# Patient Record
Sex: FEMALE | Race: WHITE | NOT HISPANIC OR LATINO | Employment: FULL TIME | URBAN - METROPOLITAN AREA
[De-identification: names, ages, dates, MRNs, and addresses within clinical notes are randomized per-mention and may not be internally consistent; named-entity substitution may affect disease eponyms.]

---

## 2017-02-09 ENCOUNTER — ALLSCRIPTS OFFICE VISIT (OUTPATIENT)
Dept: OTHER | Facility: OTHER | Age: 27
End: 2017-02-09

## 2017-06-08 ENCOUNTER — GENERIC CONVERSION - ENCOUNTER (OUTPATIENT)
Dept: OTHER | Facility: OTHER | Age: 27
End: 2017-06-08

## 2017-06-14 ENCOUNTER — GENERIC CONVERSION - ENCOUNTER (OUTPATIENT)
Dept: OTHER | Facility: OTHER | Age: 27
End: 2017-06-14

## 2017-06-14 LAB
A/G RATIO (HISTORICAL): 1.7 (ref 1.2–2.2)
ALBUMIN SERPL BCP-MCNC: 4.2 G/DL (ref 3.5–5.5)
ALP SERPL-CCNC: 55 IU/L (ref 39–117)
ALT SERPL W P-5'-P-CCNC: 13 IU/L (ref 0–32)
AST SERPL W P-5'-P-CCNC: 16 IU/L (ref 0–40)
BILIRUB SERPL-MCNC: 0.8 MG/DL (ref 0–1.2)
BUN SERPL-MCNC: 14 MG/DL (ref 6–20)
BUN/CREA RATIO (HISTORICAL): 16 (ref 9–23)
CALCIUM SERPL-MCNC: 9.4 MG/DL (ref 8.7–10.2)
CHLORIDE SERPL-SCNC: 100 MMOL/L (ref 96–106)
CHOLEST SERPL-MCNC: 172 MG/DL (ref 100–199)
CO2 SERPL-SCNC: 24 MMOL/L (ref 18–29)
CREAT SERPL-MCNC: 0.9 MG/DL (ref 0.57–1)
EGFR AFRICAN AMERICAN (HISTORICAL): 103 ML/MIN/1.73
EGFR-AMERICAN CALC (HISTORICAL): 89 ML/MIN/1.73
GLUCOSE SERPL-MCNC: 92 MG/DL (ref 65–99)
HDLC SERPL-MCNC: 62 MG/DL
LDLC SERPL CALC-MCNC: 91 MG/DL (ref 0–99)
POTASSIUM SERPL-SCNC: 4.5 MMOL/L (ref 3.5–5.2)
SODIUM SERPL-SCNC: 140 MMOL/L (ref 134–144)
TOT. GLOBULIN, SERUM (HISTORICAL): 2.5 G/DL (ref 1.5–4.5)
TOTAL PROTEIN (HISTORICAL): 6.7 G/DL (ref 6–8.5)
TRIGL SERPL-MCNC: 93 MG/DL (ref 0–149)

## 2017-06-15 LAB — VARICELLA ZOSTER IGG (HISTORICAL): <135 INDEX

## 2017-06-16 LAB
INTERPRETATION (HISTORICAL): NORMAL
QAUNTIFERON NIL VALUE (HISTORICAL): 0.08 IU/ML
QFT TB AG MINUS NIL VALUE (HISTORICAL): 0.02 IU/ML
QUANTIFERON CRITERIA (HISTORICAL): NORMAL
QUANTIFERON INCUB COMMENT (HISTORICAL): NORMAL
QUANTIFERON MITOGEN VALUE (HISTORICAL): 8.31 IU/ML
QUANTIFERON TB AG VALUE (HISTORICAL): 0.1 IU/ML
QUANTIFERON TB GOLD (HISTORICAL): NEGATIVE

## 2017-06-19 ENCOUNTER — GENERIC CONVERSION - ENCOUNTER (OUTPATIENT)
Dept: OTHER | Facility: OTHER | Age: 27
End: 2017-06-19

## 2018-01-13 NOTE — RESULT NOTES
Verified Results  (1) VARICELLA ZOSTER IMMUNITY(IGG) [id number] 11:37AM Nikhil Sharps     Test Name Result Flag Reference   Varicella Zoster IgG <135 index L Immune >165   Negative          <135                                                Equivocal    135 - 165                                                Positive          >165                 A positive result generally indicates exposure to the                 pathogen or administration of specific immunoglobulins,                 but it is not indication of active infection or stage                 of disease  ([facility]) QuantiFERON TB Gold (In Tube) [id number] 11:37AM Nikhil Sharps     Test Name Result Flag Reference   QuantiFERON Incubation Comment     Incubated, specimen forwarded to [address] [place] [address] for  completion of the assay  QuantiFERON TB Gold Negative  Negative   QuantiFERON Criteria Comment     To be considered positive a specimen should have a TB Ag minus Nil  value greater than or equal to 0 35 IU/mL and in addition the TB Ag  minus Nil value must be greater than or equal to 25% of the Nil  value  There may be insufficient information in these values to  differentiate between some negative and some indeterminate test  values  QuantiFERON TB Ag Value 0 10 IU/mL     QuantiFERON Nil Value 0 08 IU/mL     QuantiFERON Mitogen Value 8 31 IU/mL     QFT TB Ag minus Nil Value 0 02 IU/mL     Interpretation: Comment     The QuantiFERON TB Gold (in Tube) assay is intended for use as an aid  in the diagnosis of TB infection  Negative results suggest that there  is no TB infection  In patients with high suspicion of exposure, a  negative test should be repeated  A positive test indicates infection  with Mycobacterium tuberculosis  Among individuals without  tuberculosis infection, a positive test may be due to exposure to  [place], M  ciarragai or M  marinum  On the Internet, go to  cdc gov/tb for further details       (1) COMPREHENSIVE METABOLIC PANEL [id number] 11:33AM [employer] ([employer])     Test Name Result Flag Reference   Glucose, Serum 92 mg/dL  65-99   BUN 14 mg/dL  6-20   Creatinine, Serum 0 90 mg/dL  0 57-1 00   BUN/Creatinine Ratio 16  9-23   Sodium, Serum 140 mmol/L  134-144   Potassium, Serum 4 5 mmol/L  3 5-5 2   Chloride, Serum 100 mmol/L     Carbon Dioxide, Total 24 mmol/L  18-29   Calcium, Serum 9 4 mg/dL  8 7-10 2   Protein, Total, Serum 6 7 g/dL  6 0-8 5   Albumin, Serum 4 2 g/dL  3 5-5 5   Globulin, Total 2 5 g/dL  1 5-4 5   A/G Ratio 1 7  1 2-2 2   Bilirubin, Total 0 8 mg/dL  0 0-1 2   Alkaline Phosphatase, S 55 IU/L     AST (SGOT) 16 IU/L  0-40   ALT (SGPT) 13 IU/L  0-32   eGFR If NonAfricn Am 89 mL/min/1 73  >59   eGFR If Africn Am 103 mL/min/1 73  >59     (1) LIPID PANEL FASTING W DIRECT LDL REFLEX [id number] 11:33AM [employer] ([employer])     Test Name Result Flag Reference   Cholesterol, Total 172 mg/dL  100-199   Triglycerides 93 mg/dL  0-149   HDL Cholesterol 62 mg/dL  >39   LDL Cholesterol Calc 91 mg/dL  0-99

## 2018-01-14 VITALS
RESPIRATION RATE: 16 BRPM | DIASTOLIC BLOOD PRESSURE: 70 MMHG | HEIGHT: 66 IN | HEART RATE: 110 BPM | WEIGHT: 138 LBS | OXYGEN SATURATION: 98 % | BODY MASS INDEX: 22.18 KG/M2 | SYSTOLIC BLOOD PRESSURE: 130 MMHG

## 2018-02-02 ENCOUNTER — OFFICE VISIT (OUTPATIENT)
Dept: FAMILY MEDICINE CLINIC | Facility: CLINIC | Age: 28
End: 2018-02-02
Payer: COMMERCIAL

## 2018-02-02 ENCOUNTER — TELEPHONE (OUTPATIENT)
Dept: FAMILY MEDICINE CLINIC | Facility: CLINIC | Age: 28
End: 2018-02-02

## 2018-02-02 VITALS
BODY MASS INDEX: 22.82 KG/M2 | SYSTOLIC BLOOD PRESSURE: 122 MMHG | HEIGHT: 66 IN | OXYGEN SATURATION: 100 % | DIASTOLIC BLOOD PRESSURE: 84 MMHG | WEIGHT: 142 LBS | HEART RATE: 83 BPM

## 2018-02-02 DIAGNOSIS — K21.9 GASTROESOPHAGEAL REFLUX DISEASE WITHOUT ESOPHAGITIS: Primary | ICD-10-CM

## 2018-02-02 PROCEDURE — 99213 OFFICE O/P EST LOW 20 MIN: CPT | Performed by: NURSE PRACTITIONER

## 2018-02-02 RX ORDER — OMEPRAZOLE 20 MG/1
20 CAPSULE, DELAYED RELEASE ORAL DAILY
Qty: 90 CAPSULE | Refills: 0 | Status: SHIPPED | OUTPATIENT
Start: 2018-02-02 | End: 2018-05-08 | Stop reason: ALTCHOICE

## 2018-02-02 NOTE — PATIENT INSTRUCTIONS
Start Omeprazole Capsule as directed  F/up with GI as discussed-referral given  Call or return with any problems or concerns

## 2018-02-02 NOTE — PROGRESS NOTES
Assessment/Plan:    No problem-specific Assessment & Plan notes found for this encounter  Diagnoses and all orders for this visit:    Gastroesophageal reflux disease without esophagitis  -     omeprazole (PriLOSEC) 20 mg delayed release capsule; Take 1 capsule (20 mg total) by mouth daily  -     Ambulatory referral to Gastroenterology; Future    Other orders  -     Levonorgestrel (LILETTA, 52 MG,) 18 6 MCG/DAY IUD; by Intrauterine route          Subjective:      Patient ID: Hamlet Sorenson is a 33 y o  female  Has had GERD symptoms for 2 years which have become worse in the past 2 months  Takes OTC omeprazole daily with not much relief  Heartburn   She complains of abdominal pain  She reports no chest pain, no choking, no coughing, no nausea, no sore throat or no wheezing  Pertinent negatives include no fatigue  The following portions of the patient's history were reviewed and updated as appropriate: allergies, current medications, past family history, past medical history, past social history, past surgical history and problem list     Patient Instructions   Start Omeprazole Capsule as directed  F/up with GI as discussed-referral given  Call or return with any problems or concerns       Review of Systems   Constitutional: Negative for activity change, diaphoresis, fatigue and fever  HENT: Negative for congestion, facial swelling, hearing loss, rhinorrhea, sinus pain, sinus pressure, sneezing, sore throat and voice change  Eyes: Negative for discharge and visual disturbance  Respiratory: Negative for cough, choking, chest tightness, shortness of breath, wheezing and stridor  Cardiovascular: Negative for chest pain, palpitations and leg swelling  Gastrointestinal: Positive for abdominal pain  Negative for abdominal distention, constipation, diarrhea, nausea and vomiting  Endocrine: Negative for polydipsia, polyphagia and polyuria     Genitourinary: Negative for difficulty urinating, dysuria, frequency and urgency  Musculoskeletal: Negative for arthralgias, back pain, gait problem, joint swelling, myalgias, neck pain and neck stiffness  Skin: Negative for color change, rash and wound  Neurological: Negative for dizziness, syncope, speech difficulty, weakness, light-headedness and headaches  Hematological: Negative for adenopathy  Does not bruise/bleed easily  Psychiatric/Behavioral: Negative for agitation, behavioral problems, confusion, hallucinations, sleep disturbance and suicidal ideas  The patient is not nervous/anxious  Objective:     Physical Exam   Constitutional: She is oriented to person, place, and time  She appears well-developed and well-nourished  Cardiovascular: Normal rate, regular rhythm and normal heart sounds  Pulmonary/Chest: Effort normal and breath sounds normal    Abdominal: Soft  Bowel sounds are normal  There is tenderness (tenderness to epigastric with palpation)  Musculoskeletal: Normal range of motion  Neurological: She is alert and oriented to person, place, and time  Skin: Skin is warm and dry  Psychiatric: She has a normal mood and affect   Her behavior is normal  Judgment and thought content normal

## 2018-02-02 NOTE — PROGRESS NOTES
I have reviewed the notes, assessments, and/or procedures performed by BL, I concur with her/his documentation of Lowell Net

## 2018-02-02 NOTE — TELEPHONE ENCOUNTER
WOULD LIKE TO BE REFERRED [facility] [place] [facility] [address] [facility]

## 2018-02-05 ENCOUNTER — TELEPHONE (OUTPATIENT)
Dept: FAMILY MEDICINE CLINIC | Facility: CLINIC | Age: 28
End: 2018-02-05

## 2018-05-08 ENCOUNTER — OFFICE VISIT (OUTPATIENT)
Dept: FAMILY MEDICINE CLINIC | Facility: HOSPITAL | Age: 28
End: 2018-05-08
Payer: COMMERCIAL

## 2018-05-08 VITALS
WEIGHT: 142.4 LBS | HEART RATE: 80 BPM | TEMPERATURE: 98 F | BODY MASS INDEX: 22.88 KG/M2 | SYSTOLIC BLOOD PRESSURE: 120 MMHG | DIASTOLIC BLOOD PRESSURE: 72 MMHG | HEIGHT: 66 IN

## 2018-05-08 DIAGNOSIS — Z76.89 ENCOUNTER TO ESTABLISH CARE: Primary | ICD-10-CM

## 2018-05-08 DIAGNOSIS — R21 RASH OF BACK: ICD-10-CM

## 2018-05-08 PROBLEM — K21.00 GASTROESOPHAGEAL REFLUX DISEASE WITH ESOPHAGITIS: Status: ACTIVE | Noted: 2018-05-08

## 2018-05-08 PROCEDURE — 3008F BODY MASS INDEX DOCD: CPT | Performed by: NURSE PRACTITIONER

## 2018-05-08 PROCEDURE — 99202 OFFICE O/P NEW SF 15 MIN: CPT | Performed by: NURSE PRACTITIONER

## 2018-05-08 RX ORDER — RANITIDINE 150 MG/1
150 TABLET ORAL 2 TIMES DAILY
COMMUNITY
End: 2020-08-12

## 2018-05-08 NOTE — PROGRESS NOTES
Assessment/Plan:     Rash on back possible contact dermatitis  Will treat with stronger topical steroid  Advised starting with small area and if improvement can proceed to treat the whole rash  If no improvement recommend she see dermatology  Diagnoses and all orders for this visit:    Encounter to establish care    Rash of back  -     betamethasone valerate (VALISONE) 0 1 % cream; Apply topically 2 (two) times a day    Other orders  -     ranitidine (ZANTAC) 150 mg tablet; Take 150 mg by mouth 2 (two) times a day          Subjective:     Patient ID: Nesha King is a 33 y o  female  Had GERD for a long time  Had EGD a few months ago and saw inflammation  Prilosec not working  Now on Zantac twice/day    h/o anxiety  Has tried paxil  Made her feel foggy so stopped  Just dealing with it  Started with rash over 1 month ago  h/o back and face acne  Started as what appeared as back acne  1 or 2 spots  Used benzoyl peroxide wash and the next day got much worse  Very itchy  Waited 2 weeks but no improvement  Used hydocortisone cream  Got a little better but then flared up  [facility]  Has IUD  UTD with pap  Review of Systems   Constitutional: Negative for chills, fatigue and fever  Skin: Positive for rash  Psychiatric/Behavioral: The patient is nervous/anxious  The following portions of the patient's history were reviewed and updated as appropriate: allergies, current medications, past family history, past medical history, past social history, past surgical history and problem list     Objective:  Vitals:    05/08/18 0911   BP: 120/72   Pulse: 80   Temp: 98 °F (36 7 °C)      Physical Exam   Constitutional: She is oriented to person, place, and time  She appears well-developed and well-nourished  Cardiovascular: Normal rate, regular rhythm and normal heart sounds      Pulmonary/Chest: Effort normal and breath sounds normal    Neurological: She is alert and oriented to person, place, and time  Skin: Skin is warm and dry  Rash noted  B/L upper back, down spine and B/L lower back with red papular rash  Some pustules noted  In between rash areas of flesh colored papules  Psychiatric: She has a normal mood and affect

## 2018-10-23 DIAGNOSIS — H10.33 ACUTE CONJUNCTIVITIS OF BOTH EYES, UNSPECIFIED ACUTE CONJUNCTIVITIS TYPE: Primary | ICD-10-CM

## 2018-10-23 RX ORDER — POLYMYXIN B SULFATE AND TRIMETHOPRIM 1; 10000 MG/ML; [USP'U]/ML
2 SOLUTION OPHTHALMIC EVERY 4 HOURS
Qty: 10 ML | Refills: 0 | Status: SHIPPED | OUTPATIENT
Start: 2018-10-23 | End: 2019-07-25

## 2019-06-03 ENCOUNTER — TELEPHONE (OUTPATIENT)
Dept: FAMILY MEDICINE CLINIC | Facility: HOSPITAL | Age: 29
End: 2019-06-03

## 2019-07-25 ENCOUNTER — OFFICE VISIT (OUTPATIENT)
Dept: FAMILY MEDICINE CLINIC | Facility: OTHER | Age: 29
End: 2019-07-25
Payer: COMMERCIAL

## 2019-07-25 VITALS
WEIGHT: 142.5 LBS | HEIGHT: 66 IN | OXYGEN SATURATION: 99 % | TEMPERATURE: 98.5 F | DIASTOLIC BLOOD PRESSURE: 70 MMHG | BODY MASS INDEX: 22.9 KG/M2 | SYSTOLIC BLOOD PRESSURE: 122 MMHG | HEART RATE: 72 BPM

## 2019-07-25 DIAGNOSIS — R30.0 DYSURIA: Primary | ICD-10-CM

## 2019-07-25 DIAGNOSIS — Z20.2 POSSIBLE EXPOSURE TO STD: ICD-10-CM

## 2019-07-25 LAB
SL AMB  POCT GLUCOSE, UA: ABNORMAL
SL AMB LEUKOCYTE ESTERASE,UA: ABNORMAL
SL AMB POCT BILIRUBIN,UA: ABNORMAL
SL AMB POCT BLOOD,UA: ABNORMAL
SL AMB POCT CLARITY,UA: ABNORMAL
SL AMB POCT COLOR,UA: YELLOW
SL AMB POCT KETONES,UA: ABNORMAL
SL AMB POCT NITRITE,UA: ABNORMAL
SL AMB POCT PH,UA: 6
SL AMB POCT SPECIFIC GRAVITY,UA: 1.01
SL AMB POCT URINE PROTEIN: ABNORMAL
SL AMB POCT UROBILINOGEN: ABNORMAL

## 2019-07-25 PROCEDURE — 99203 OFFICE O/P NEW LOW 30 MIN: CPT | Performed by: FAMILY MEDICINE

## 2019-07-25 PROCEDURE — 87077 CULTURE AEROBIC IDENTIFY: CPT | Performed by: FAMILY MEDICINE

## 2019-07-25 PROCEDURE — 87186 SC STD MICRODIL/AGAR DIL: CPT | Performed by: FAMILY MEDICINE

## 2019-07-25 PROCEDURE — 81003 URINALYSIS AUTO W/O SCOPE: CPT | Performed by: FAMILY MEDICINE

## 2019-07-25 PROCEDURE — 87086 URINE CULTURE/COLONY COUNT: CPT | Performed by: FAMILY MEDICINE

## 2019-07-25 RX ORDER — CIPROFLOXACIN 250 MG/1
250 TABLET, FILM COATED ORAL EVERY 12 HOURS SCHEDULED
Qty: 6 TABLET | Refills: 0 | Status: SHIPPED | OUTPATIENT
Start: 2019-07-25 | End: 2019-07-28

## 2019-07-25 NOTE — PROGRESS NOTES
Subjective:      Patient ID: Maria Luisa Baird is a 33 y o  female  Urinary Tract Infection    This is a new problem  The current episode started yesterday  The problem occurs every urination  The problem has been waxing and waning  The quality of the pain is described as burning  The pain is mild  There has been no fever  She is sexually active  There is no history of pyelonephritis  Associated symptoms include frequency and urgency  Pertinent negatives include no chills, discharge, flank pain, hematuria, hesitancy, nausea, possible pregnancy, sweats or vomiting  She has tried increased fluids for the symptoms  The treatment provided no relief  There is no history of catheterization, kidney stones, recurrent UTIs, a single kidney, urinary stasis or a urological procedure  The following portions of the patient's history were reviewed and updated as appropriate: allergies, current medications, past family history, past medical history, past social history, past surgical history and problem list       Current Outpatient Medications:     Levonorgestrel (LILETTA, 52 MG,) 18 6 MCG/DAY IUD, by Intrauterine route, Disp: , Rfl:     ranitidine (ZANTAC) 150 mg tablet, Take 150 mg by mouth 2 (two) times a day, Disp: , Rfl:     betamethasone valerate (VALISONE) 0 1 % cream, Apply topically 2 (two) times a day (Patient not taking: Reported on 7/25/2019), Disp: 30 g, Rfl: 1    ciprofloxacin (CIPRO) 250 mg tablet, Take 1 tablet (250 mg total) by mouth every 12 (twelve) hours for 3 days Take with food, Disp: 6 tablet, Rfl: 0      Review of Systems   Constitutional: Negative for activity change, appetite change, chills, fatigue and fever  HENT: Negative for congestion, ear pain, postnasal drip and sore throat  Eyes: Negative for pain and redness  Respiratory: Negative for cough and shortness of breath  Cardiovascular: Negative for chest pain, palpitations and leg swelling     Gastrointestinal: Negative for abdominal pain, constipation, diarrhea, nausea and vomiting  Genitourinary: Positive for difficulty urinating, dysuria, frequency and urgency  Negative for flank pain, hematuria and hesitancy  Musculoskeletal: Negative for back pain and joint swelling  Skin: Negative for rash  Neurological: Negative for dizziness and syncope  Psychiatric/Behavioral: Negative for dysphoric mood  The patient is not nervous/anxious  Objective:      /70 (BP Location: Left arm, Patient Position: Sitting, Cuff Size: Adult)   Pulse 72   Temp 98 5 °F (36 9 °C) (Tympanic)   Ht 5' 6" (1 676 m)   Wt 64 6 kg (142 lb 8 oz)   SpO2 99%   BMI 23 00 kg/m²          Physical Exam   Constitutional: She is oriented to person, place, and time  She appears well-developed and well-nourished  No distress  Body mass index is 23 kg/m²  HENT:   Head: Normocephalic and atraumatic  Mouth/Throat: Oropharynx is clear and moist    Eyes: Pupils are equal, round, and reactive to light  Conjunctivae and EOM are normal  No scleral icterus  Neck: Normal range of motion  Neck supple  No thyromegaly present  Cardiovascular: Normal rate, regular rhythm and normal heart sounds  Pulmonary/Chest: Effort normal and breath sounds normal  No respiratory distress  She has no wheezes  Abdominal: Soft  Bowel sounds are normal  She exhibits no distension  There is tenderness in the suprapubic area  There is no CVA tenderness  Musculoskeletal: Normal range of motion  She exhibits no edema, tenderness or deformity  Lymphadenopathy:     She has no cervical adenopathy  Neurological: She is alert and oriented to person, place, and time  She has normal reflexes  No cranial nerve deficit  Coordination normal    Skin: Skin is warm and dry  No rash noted  No erythema  No pallor  Psychiatric: She has a normal mood and affect  Her behavior is normal    Vitals reviewed          Assessment/Plan:   Diagnoses and all orders for this visit:    Dysuria  -     POCT urine dip auto non-scope  -     ciprofloxacin (CIPRO) 250 mg tablet; Take 1 tablet (250 mg total) by mouth every 12 (twelve) hours for 3 days Take with food  -     Urine culture; Future    Possible exposure to STD  -     Chlamydia/GC amplified DNA by PCR; Future            Return if symptoms worsen or fail to improve  The patient indicates understanding of these issues and agrees with the plan            Sandra Mcclelland, DO

## 2019-07-27 LAB — BACTERIA UR CULT: ABNORMAL

## 2019-08-13 ENCOUNTER — OFFICE VISIT (OUTPATIENT)
Dept: FAMILY MEDICINE CLINIC | Facility: OTHER | Age: 29
End: 2019-08-13
Payer: COMMERCIAL

## 2019-08-13 VITALS
BODY MASS INDEX: 23 KG/M2 | DIASTOLIC BLOOD PRESSURE: 64 MMHG | OXYGEN SATURATION: 99 % | HEIGHT: 66 IN | HEART RATE: 73 BPM | WEIGHT: 143.13 LBS | TEMPERATURE: 98.7 F | SYSTOLIC BLOOD PRESSURE: 100 MMHG

## 2019-08-13 DIAGNOSIS — Z00.00 ANNUAL PHYSICAL EXAM: Primary | ICD-10-CM

## 2019-08-13 DIAGNOSIS — R17 ELEVATED BILIRUBIN: ICD-10-CM

## 2019-08-13 PROCEDURE — 99395 PREV VISIT EST AGE 18-39: CPT | Performed by: FAMILY MEDICINE

## 2019-08-13 NOTE — PROGRESS NOTES
ADULT ANNUAL PHYSICAL  [facility] [place]    NAME: Buck Yao  AGE: 33 y o  SEX: female  : 1990     DATE: 2019       Chief Complaint:     Chief Complaint   Patient presents with    Annual Exam      History of Present Illness:     Adult Annual Physical   Patient here for a comprehensive physical exam  The patient reports no problems  Diet and Physical Activity  · Diet/Nutrition: well balanced diet, consuming 3-5 servings of fruits/vegetables daily, adequate fiber intake and adequate whole grain intake  · Exercise: moderate cardiovascular exercise, strength training exercises, 1-2 times a week on average and 30-60 minutes on average  Depression Screening  PHQ-9 Depression Screening    PHQ-9:    Frequency of the following problems over the past two weeks:       Little interest or pleasure in doing things:  0 - not at all  Feeling down, depressed, or hopeless:  0 - not at all  PHQ-2 Score:  0       General Health  · Sleep: sleeps well and gets 7-8 hours of sleep on average  · Hearing: normal - bilateral   · Vision: goes for regular eye exams and wears glasses  · Dental: regular dental visits  /GYN Health  · Last menstrual period: irregular  Liletta   · Contraceptive method: IUD placement  · History of STDs?: no   · Last pap 2018       Review of Systems:     Review of Systems   Constitutional: Negative for appetite change, fatigue, fever and unexpected weight change  HENT: Negative for congestion, dental problem, ear pain, postnasal drip, sore throat and tinnitus  Eyes: Negative for pain, discharge and visual disturbance  Respiratory: Negative for cough, shortness of breath and wheezing  Cardiovascular: Negative for chest pain, palpitations and leg swelling  Gastrointestinal: Negative for abdominal pain, constipation, diarrhea, nausea and vomiting  Endocrine: Negative for cold intolerance and heat intolerance  Genitourinary: Negative for difficulty urinating, dysuria, flank pain and urgency  Musculoskeletal: Negative for arthralgias, back pain, joint swelling and myalgias  Skin: Negative for rash and wound  Allergic/Immunologic: Negative for immunocompromised state  Neurological: Negative for dizziness, syncope, speech difficulty, weakness and numbness  Hematological: Negative for adenopathy  Does not bruise/bleed easily  Psychiatric/Behavioral: Negative for confusion, dysphoric mood and sleep disturbance  The patient is not nervous/anxious         Past Medical History:     Past Medical History:   Diagnosis Date    Anxiety     GERD (gastroesophageal reflux disease)       Past Surgical History:     Past Surgical History:   Procedure Laterality Date    MYRINGOTOMY        Social History:     Social History     Socioeconomic History    Marital status: Single     Spouse name: None    Number of children: None    Years of education: None    Highest education level: None   Occupational History    None   Social Needs    Financial resource strain: None    Food insecurity:     Worry: None     Inability: None    Transportation needs:     Medical: None     Non-medical: None   Tobacco Use    Smoking status: Never Smoker    Smokeless tobacco: Never Used   Substance and Sexual Activity    Alcohol use: Yes     Comment: socially    Drug use: No    Sexual activity: Yes     Partners: Male     Birth control/protection: IUD   Lifestyle    Physical activity:     Days per week: None     Minutes per session: None    Stress: None   Relationships    Social connections:     Talks on phone: None     Gets together: None     Attends Yazdanism service: None     Active member of club or organization: None     Attends meetings of clubs or organizations: None     Relationship status: None    Intimate partner violence:     Fear of current or ex partner: No     Emotionally abused: No     Physically abused: No     Forced sexual activity: No   Other Topics Concern    None   Social History Narrative    None      Family History:     Family History   Problem Relation Age of Onset    Depression Mother     Hypertension Father     Cancer Paternal Grandmother     Heart attack Maternal Grandfather       Current Medications:     Current Outpatient Medications   Medication Sig Dispense Refill    Levonorgestrel (LILETTA, 52 MG,) 18 6 MCG/DAY IUD by Intrauterine route      ranitidine (ZANTAC) 150 mg tablet Take 150 mg by mouth 2 (two) times a day       No current facility-administered medications for this visit  Allergies: Allergies   Allergen Reactions    Latex Rash        Physical Exam:     /64 (BP Location: Left arm, Patient Position: Sitting, Cuff Size: Adult)   Pulse 73   Temp 98 7 °F (37 1 °C) (Tympanic)   Ht 5' 6" (1 676 m)   Wt 64 9 kg (143 lb 2 oz)   SpO2 99%   BMI 23 10 kg/m²     Physical Exam   Constitutional: She is oriented to person, place, and time  She appears well-developed and well-nourished  No distress  Body mass index is 23 1 kg/m²  HENT:   Head: Normocephalic and atraumatic  Right Ear: Hearing, tympanic membrane, external ear and ear canal normal    Left Ear: Hearing, tympanic membrane, external ear and ear canal normal    Nose: Nose normal    Mouth/Throat: Uvula is midline, oropharynx is clear and moist and mucous membranes are normal  No oropharyngeal exudate  Eyes: Pupils are equal, round, and reactive to light  Conjunctivae and EOM are normal  No scleral icterus  Neck: Normal range of motion  Neck supple  No thyromegaly present  Cardiovascular: Normal rate, regular rhythm and normal heart sounds  No murmur heard  Pulmonary/Chest: Effort normal and breath sounds normal  No respiratory distress  She has no wheezes  She has no rales  Abdominal: Soft  Bowel sounds are normal  She exhibits no distension and no mass  There is no tenderness     Musculoskeletal: Normal range of motion  She exhibits no edema, tenderness or deformity  Lymphadenopathy:     She has no cervical adenopathy  Neurological: She is alert and oriented to person, place, and time  She has normal reflexes  No cranial nerve deficit  Coordination normal    Skin: Skin is warm and dry  No rash noted  No erythema  No pallor  Psychiatric: She has a normal mood and affect  Her behavior is normal    Nursing note and vitals reviewed  Assessment and Plan:     Problem List Items Addressed This Visit     None      Visit Diagnoses     Annual physical exam    -  Primary    Elevated bilirubin        Relevant Orders    Comprehensive metabolic panel          Immunizations and preventive care screenings were discussed with patient today  Appropriate education was printed on patient's after visit summary  Counseling:  Alcohol/drug use: discussed moderation in alcohol intake and avoidance of illicit drug use  Dental Health: discussed importance of regular tooth brushing, flossing, and dental visits  Injury prevention: discussed safety/seat belts, safety helmets, smoke detectors, carbon dioxide detectors, and smoking near bedding or upholstery  · Sexual health: discussed sexually transmitted diseases, partner selection, use of condoms, avoidance of unintended pregnancy, and contraceptive alternatives      Return in about 1 year (around 8/13/2020) for Annual physical       Lizet Jack

## 2019-08-13 NOTE — PATIENT INSTRUCTIONS

## 2020-03-27 ENCOUNTER — TELEMEDICINE (OUTPATIENT)
Dept: FAMILY MEDICINE CLINIC | Facility: OTHER | Age: 30
End: 2020-03-27
Payer: COMMERCIAL

## 2020-03-27 DIAGNOSIS — R50.9 FEVER, UNSPECIFIED FEVER CAUSE: Primary | ICD-10-CM

## 2020-03-27 PROCEDURE — G2012 BRIEF CHECK IN BY MD/QHP: HCPCS | Performed by: NURSE PRACTITIONER

## 2020-03-27 NOTE — LETTER
March 27, 2020     Patient: Shiva Mcdaniel   YOB: 1990   Date of Visit: 3/27/2020       To Whom it May Concern:    Shiva Mcdaniel is under my professional care  She had a phone visit with our office on 3/27/20  At this time she exhibits no cough, shortness of breath, body aches, or fever (oral temp > 100 4), that would warrant COVID testing or preclude her from working  She was instructed to check her temperature using an oral thermometer to ensure accuracy, and to notify both you and us if any of these signs and symptoms develop  If you have any questions or concerns, please don't hesitate to call           Sincerely,          PADMA Walker        CC: No Recipients

## 2020-03-27 NOTE — PROGRESS NOTES
Virtual Regular Visit    Problem List Items Addressed This Visit     None               Reason for visit is ***    Encounter provider PADMA Llanos    Provider located at [address] [place] [id number]      Recent Visits  No visits were found meeting these conditions  Showing recent visits within past 7 days and meeting all other requirements     Today's Visits  Date Type Provider Dept   03/27/20 Telemedicine PADMA Llanos [facility]   Showing today's visits and meeting all other requirements     Future Appointments  Date Type Provider Dept   03/27/20 Telemedicine PADMA Llanos [facility]   Showing future appointments within next 150 days and meeting all other requirements        After connecting through [employer], the patient was identified by name and date of birth  Humza Markham was informed that this is a telemedicine visit and that the visit is being conducted through {AMB CORONAVIRUS VISIT LQGZWD:21358} which may not be secure and therefore, might not be HIPAA-compliant  {Telemedicine confidentiality :10355} {Telemedicine participants:20451}  She acknowledged consent and understanding of privacy and security of the video platform  The patient has agreed to participate and understands they can discontinue the visit at any time  Subjective  Humza Markham is a 30 y o  female ***  Past Medical History:   Diagnosis Date    Anxiety     GERD (gastroesophageal reflux disease)        Past Surgical History:   Procedure Laterality Date    MYRINGOTOMY         Current Outpatient Medications   Medication Sig Dispense Refill    Levonorgestrel (LILETTA, 52 MG,) 18 6 MCG/DAY IUD by Intrauterine route      ranitidine (ZANTAC) 150 mg tablet Take 150 mg by mouth 2 (two) times a day       No current facility-administered medications for this visit           Allergies   Allergen Reactions    Latex Rash       Review of Systems    Physical Exam     I spent *** minutes with the patient during this visit

## 2020-03-27 NOTE — PROGRESS NOTES
Virtual Regular Visit    Problem List Items Addressed This Visit     None      Visit Diagnoses     Fever, unspecified fever cause      --Asymptomatic except for high normal tympanic temperature today only  Advise documenting with oral thermometer  If remains < 100 4 and without symptoms, OK to go back to work  Note written  --Advised to call if she develops cough, dyspnea, body aches, overt fever               Reason for visit is fever    Encounter provider PADMA Sigala    Provider located at [address] [address]      Recent Visits  No visits were found meeting these conditions  Showing recent visits within past 7 days and meeting all other requirements     Today's Visits  Date Type Provider Dept   03/27/20 Telemedicine PADMA Sigala [facility]   Showing today's visits and meeting all other requirements     Future Appointments  Date Type Provider Dept   03/27/20 Telemedicine PADMA Sigala [facility]   Showing future appointments within next 150 days and meeting all other requirements        After connecting through [employer], the patient was identified by name and date of birth  Alexandru Jeff was informed that this is a telemedicine visit and that the visit is being conducted through telephone which may not be secure and therefore, might not be HIPAA-compliant  My office door was closed  No one else was in the room  She acknowledged consent and understanding of privacy and security of the video platform  The patient has agreed to participate and understands they can discontinue the visit at any time  Subjective  Alexandru Jeff is a 30 y o  female   Has concerns about possible fever  Documented 100 2 today for the first time, using tympanic thermometer (not oral)  Mild allergy symptoms x couple weeks (runny, stuffy nose, some itching)  Feels fine otherwise     No chills, cough, sore throat, headache, body aches, shortness of breath, decreased appetite  No antipyretics taken  No obvious sick contacts, but 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[address])  Needs note to go back to work    No recent travel to [place] areas domestically, or overseas        Past Medical History:   Diagnosis Date    Anxiety     GERD (gastroesophageal reflux disease)        Past Surgical History:   Procedure Laterality Date    MYRINGOTOMY         Current Outpatient Medications   Medication Sig Dispense Refill    Levonorgestrel (LILETTA, 52 MG,) 18 6 MCG/DAY IUD by Intrauterine route      ranitidine (ZANTAC) 150 mg tablet Take 150 mg by mouth 2 (two) times a day       No current facility-administered medications for this visit  Allergies   Allergen Reactions    Latex Rash       Review of Systems   Constitutional: Positive for fever  HENT: Negative for rhinorrhea and sore throat  Respiratory: Negative for cough and shortness of breath  Musculoskeletal: Negative for myalgias  Neurological: Negative for headaches  I spent 20 minutes with the patient during this visit

## 2020-08-13 ENCOUNTER — OFFICE VISIT (OUTPATIENT)
Dept: FAMILY MEDICINE CLINIC | Facility: OTHER | Age: 30
End: 2020-08-13
Payer: COMMERCIAL

## 2020-08-13 VITALS
OXYGEN SATURATION: 97 % | BODY MASS INDEX: 24.31 KG/M2 | TEMPERATURE: 99.1 F | HEART RATE: 85 BPM | SYSTOLIC BLOOD PRESSURE: 112 MMHG | DIASTOLIC BLOOD PRESSURE: 78 MMHG | HEIGHT: 66 IN | WEIGHT: 151.25 LBS

## 2020-08-13 DIAGNOSIS — K21.00 GASTROESOPHAGEAL REFLUX DISEASE WITH ESOPHAGITIS: ICD-10-CM

## 2020-08-13 DIAGNOSIS — F41.9 ANXIETY: Primary | ICD-10-CM

## 2020-08-13 PROCEDURE — 1036F TOBACCO NON-USER: CPT | Performed by: FAMILY MEDICINE

## 2020-08-13 PROCEDURE — 99213 OFFICE O/P EST LOW 20 MIN: CPT | Performed by: FAMILY MEDICINE

## 2020-08-13 PROCEDURE — 3008F BODY MASS INDEX DOCD: CPT | Performed by: FAMILY MEDICINE

## 2020-08-13 RX ORDER — BUSPIRONE HYDROCHLORIDE 5 MG/1
5 TABLET ORAL 2 TIMES DAILY
Qty: 60 TABLET | Refills: 1 | Status: SHIPPED | OUTPATIENT
Start: 2020-08-13 | End: 2020-09-08 | Stop reason: SDUPTHER

## 2020-08-13 RX ORDER — OMEPRAZOLE 40 MG/1
40 CAPSULE, DELAYED RELEASE ORAL
Qty: 90 CAPSULE | Refills: 0 | Status: SHIPPED | OUTPATIENT
Start: 2020-08-13 | End: 2021-06-21 | Stop reason: SDUPTHER

## 2020-08-13 NOTE — PROGRESS NOTES
Assessment/Plan:    No problem-specific Assessment & Plan notes found for this encounter  Diagnoses and all orders for this visit:    Anxiety  Comments:  Trial of buspar Rx'd today  Will follow up in 3-4 wks to reassess and renew  Orders:  -     busPIRone (BUSPAR) 5 mg tablet; Take 1 tablet (5 mg total) by mouth 2 (two) times a day    Gastroesophageal reflux disease with esophagitis  Comments:  Exacerbated by stress  Diet reviewed  Continue Omeprazole 40 mg daily for next 1-2 months, then reassess  Orders:  -     omeprazole (PriLOSEC) 40 MG capsule; Take 1 capsule (40 mg total) by mouth daily before breakfast    Other orders  -     Cancel: TD VACCINE GREATER THAN OR EQUAL TO 8YO PRESERVATIVE FREE IM        Return in about 3 weeks (around 9/3/2020) for Recheck mood, meds  The patient indicates understanding of these issues and agrees with the plan  Subjective:      Patient ID: Irasema Bojorquez is a 30 y o  female  Pt presents c/o increased GERD secondary to worsening anxiety  Stress 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[facility]) -- did not get along with boss  Was terminated from job yesterday and is devastated   She is interested in starting Buspar -- does not want to try benzo or SSRI at this time    Anxiety   Presents for initial visit  Onset was at an unknown time  The problem has been rapidly worsening  Symptoms include decreased concentration, depressed mood, excessive worry, irritability, muscle tension, nausea, nervous/anxious behavior, obsessions and restlessness  Patient reports no chest pain, compulsions, confusion, dizziness, dry mouth, feeling of choking, hyperventilation, insomnia, malaise, palpitations, panic, shortness of breath or suicidal ideas  Symptoms occur constantly  The severity of symptoms is causing significant distress  The symptoms are aggravated by work stress  The quality of sleep is fair  Nighttime awakenings: one to two       Risk factors include a major life event  Her past medical history is significant for anxiety/panic attacks  There is no history of anemia, arrhythmia, asthma, bipolar disorder, CAD, CHF, chronic lung disease, depression, fibromyalgia, hyperthyroidism or suicide attempts  Past treatments include nothing  The following portions of the patient's history were reviewed and updated as appropriate: allergies, current medications, past family history, past medical history, past social history, past surgical history and problem list       Current Outpatient Medications:     Levonorgestrel (LILETTA, 52 MG,) 18 6 MCG/DAY IUD, by Intrauterine route, Disp: , Rfl:     busPIRone (BUSPAR) 5 mg tablet, Take 1 tablet (5 mg total) by mouth 2 (two) times a day, Disp: 60 tablet, Rfl: 1    omeprazole (PriLOSEC) 40 MG capsule, Take 1 capsule (40 mg total) by mouth daily before breakfast, Disp: 90 capsule, Rfl: 0      Review of Systems   Constitutional: Positive for irritability  Negative for activity change, fatigue and fever  HENT: Negative for congestion, ear pain, sinus pain and sore throat  Eyes: Negative for pain and itching  Respiratory: Negative for cough and shortness of breath  Cardiovascular: Negative for chest pain and palpitations  Gastrointestinal: Positive for nausea  Negative for abdominal pain, constipation, diarrhea and vomiting  Endocrine: Negative for cold intolerance and heat intolerance  Genitourinary: Negative for dysuria  Musculoskeletal: Negative for myalgias  Skin: Negative for color change and rash  Neurological: Negative for dizziness, syncope and headaches  Hematological: Negative for adenopathy  Psychiatric/Behavioral: Positive for decreased concentration  Negative for behavioral problems, confusion, dysphoric mood, sleep disturbance and suicidal ideas  The patient is nervous/anxious  The patient does not have insomnia            Objective:      /78 (BP Location: Left arm, Patient Position: Sitting, Cuff Size: Adult)   Pulse 85   Temp 99 1 °F (37 3 °C) (Temporal)   Ht 5' 6" (1 676 m)   Wt 68 6 kg (151 lb 4 oz)   SpO2 97%   BMI 24 41 kg/m²          Physical Exam  Vitals signs and nursing note reviewed  Constitutional:       General: She is not in acute distress  Appearance: Normal appearance  She is well-developed  She is not ill-appearing  HENT:      Head: Normocephalic and atraumatic  Right Ear: External ear normal       Left Ear: External ear normal       Nose: Nose normal    Eyes:      General: No scleral icterus  Conjunctiva/sclera: Conjunctivae normal       Pupils: Pupils are equal, round, and reactive to light  Neck:      Musculoskeletal: Normal range of motion and neck supple  Thyroid: No thyromegaly  Cardiovascular:      Rate and Rhythm: Normal rate and regular rhythm  Heart sounds: Normal heart sounds  No murmur  Pulmonary:      Effort: Pulmonary effort is normal  No respiratory distress  Breath sounds: Normal breath sounds  No wheezing  Abdominal:      General: Bowel sounds are normal  There is no distension  Palpations: Abdomen is soft  Tenderness: There is no abdominal tenderness  Musculoskeletal: Normal range of motion  General: No tenderness or deformity  Right lower leg: No edema  Left lower leg: No edema  Lymphadenopathy:      Cervical: No cervical adenopathy  Skin:     General: Skin is warm and dry  Findings: No rash  Neurological:      General: No focal deficit present  Mental Status: She is alert and oriented to person, place, and time  Cranial Nerves: No cranial nerve deficit  Psychiatric:         Attention and Perception: Attention and perception normal          Mood and Affect: Mood is anxious  Speech: Speech normal          Behavior: Behavior normal          Thought Content:  Thought content normal          Cognition and Memory: Cognition and memory normal          Judgment: Judgment normal       Comments: Tearful

## 2020-09-08 ENCOUNTER — OFFICE VISIT (OUTPATIENT)
Dept: FAMILY MEDICINE CLINIC | Facility: OTHER | Age: 30
End: 2020-09-08
Payer: COMMERCIAL

## 2020-09-08 VITALS
SYSTOLIC BLOOD PRESSURE: 100 MMHG | DIASTOLIC BLOOD PRESSURE: 62 MMHG | HEIGHT: 66 IN | WEIGHT: 153 LBS | BODY MASS INDEX: 24.59 KG/M2 | OXYGEN SATURATION: 98 % | TEMPERATURE: 98.2 F | HEART RATE: 78 BPM

## 2020-09-08 DIAGNOSIS — F41.9 ANXIETY: ICD-10-CM

## 2020-09-08 PROCEDURE — 99213 OFFICE O/P EST LOW 20 MIN: CPT | Performed by: FAMILY MEDICINE

## 2020-09-08 RX ORDER — BUSPIRONE HYDROCHLORIDE 5 MG/1
5 TABLET ORAL 2 TIMES DAILY
Qty: 60 TABLET | Refills: 1 | Status: SHIPPED | OUTPATIENT
Start: 2020-09-08 | End: 2021-04-15

## 2020-09-10 NOTE — PROGRESS NOTES
Assessment/Plan:    No problem-specific Assessment & Plan notes found for this encounter  Diagnoses and all orders for this visit:    Anxiety  Comments:  Continue Buspar  Take with food to minimize nausea s/e, or consider taking 1/2 tab  Discussed SSRI's briefly -- pt will think about this moving forward  Orders:  -     busPIRone (BUSPAR) 5 mg tablet; Take 1 tablet (5 mg total) by mouth 2 (two) times a day        Return in about 3 months (around 12/8/2020) for Annual physical     The patient indicates understanding of these issues and agrees with the plan  Subjective:      Patient ID: Osborne Lefort is a 30 y o  female  HPI   Pt presents for f/u anxiety  States that she is feeling better with the Buspar  Notes some mild nausea with the medication  Mood is improving overall as she comes to terms with her employment situation  She is optimistic and currently looking for new opportunities  Denies HI/SI, hallucinations        The following portions of the patient's history were reviewed and updated as appropriate: allergies, current medications, past family history, past medical history, past social history, past surgical history and problem list       Current Outpatient Medications:     busPIRone (BUSPAR) 5 mg tablet, Take 1 tablet (5 mg total) by mouth 2 (two) times a day, Disp: 60 tablet, Rfl: 1    Levonorgestrel (LILETTA, 52 MG,) 18 6 MCG/DAY IUD, by Intrauterine route, Disp: , Rfl:     omeprazole (PriLOSEC) 40 MG capsule, Take 1 capsule (40 mg total) by mouth daily before breakfast, Disp: 90 capsule, Rfl: 0      Review of Systems   Constitutional: Negative for activity change, fatigue and fever  HENT: Negative for congestion, ear pain, sinus pain and sore throat  Eyes: Negative for pain and itching  Respiratory: Negative for cough and shortness of breath  Cardiovascular: Negative for chest pain and palpitations     Gastrointestinal: Negative for abdominal pain, constipation, diarrhea, nausea and vomiting  Endocrine: Negative for cold intolerance and heat intolerance  Genitourinary: Negative for dysuria  Musculoskeletal: Negative for myalgias  Skin: Negative for color change and rash  Neurological: Negative for dizziness, syncope and headaches  Hematological: Negative for adenopathy  Psychiatric/Behavioral: Positive for decreased concentration  Negative for behavioral problems, dysphoric mood, self-injury, sleep disturbance and suicidal ideas  The patient is nervous/anxious  Sx improving         Objective:      /62 (BP Location: Right arm, Patient Position: Sitting, Cuff Size: Large)   Pulse 78   Temp 98 2 °F (36 8 °C) (Temporal)   Ht 5' 6" (1 676 m)   Wt 69 4 kg (153 lb)   SpO2 98%   BMI 24 69 kg/m²          Physical Exam  Vitals signs and nursing note reviewed  Constitutional:       General: She is not in acute distress  Appearance: Normal appearance  She is well-developed  She is not ill-appearing  HENT:      Head: Normocephalic and atraumatic  Right Ear: External ear normal       Left Ear: External ear normal       Nose: Nose normal    Eyes:      General: No scleral icterus  Conjunctiva/sclera: Conjunctivae normal       Pupils: Pupils are equal, round, and reactive to light  Neck:      Musculoskeletal: Normal range of motion and neck supple  Thyroid: No thyromegaly  Cardiovascular:      Rate and Rhythm: Normal rate and regular rhythm  Heart sounds: Normal heart sounds  No murmur  Pulmonary:      Effort: Pulmonary effort is normal  No respiratory distress  Breath sounds: Normal breath sounds  No wheezing  Abdominal:      General: Bowel sounds are normal  There is no distension  Palpations: Abdomen is soft  Tenderness: There is no abdominal tenderness  Musculoskeletal: Normal range of motion  General: No tenderness  Right lower leg: No edema  Left lower leg: No edema     Lymphadenopathy: Cervical: No cervical adenopathy  Skin:     General: Skin is warm and dry  Coloration: Skin is not jaundiced  Findings: No rash  Neurological:      General: No focal deficit present  Mental Status: She is alert and oriented to person, place, and time  Cranial Nerves: No cranial nerve deficit        Gait: Gait normal    Psychiatric:         Mood and Affect: Mood normal          Behavior: Behavior normal

## 2021-04-14 NOTE — PROGRESS NOTES
Assessment/Plan:    No problem-specific Assessment & Plan notes found for this encounter  Diagnoses and all orders for this visit:    Anxiety          Subjective:      Patient ID: Karli Toro is a 35 y o  female  Anxiety        Depression        The following portions of the patient's history were reviewed and updated as appropriate: allergies, current medications, past family history, past medical history, past social history, past surgical history and problem list       Current Outpatient Medications:     busPIRone (BUSPAR) 5 mg tablet, Take 1 tablet (5 mg total) by mouth 2 (two) times a day, Disp: 60 tablet, Rfl: 1    Levonorgestrel (LILETTA, 52 MG,) 18 6 MCG/DAY IUD, by Intrauterine route, Disp: , Rfl:     omeprazole (PriLOSEC) 40 MG capsule, Take 1 capsule (40 mg total) by mouth daily before breakfast, Disp: 90 capsule, Rfl: 0      Review of Systems   Psychiatric/Behavioral: Positive for depression  Objective: There were no vitals taken for this visit           Physical Exam

## 2021-04-15 ENCOUNTER — OFFICE VISIT (OUTPATIENT)
Dept: FAMILY MEDICINE CLINIC | Facility: OTHER | Age: 31
End: 2021-04-15
Payer: COMMERCIAL

## 2021-04-15 VITALS
OXYGEN SATURATION: 98 % | BODY MASS INDEX: 23.78 KG/M2 | TEMPERATURE: 97.8 F | RESPIRATION RATE: 16 BRPM | HEART RATE: 70 BPM | HEIGHT: 66 IN | WEIGHT: 148 LBS | DIASTOLIC BLOOD PRESSURE: 78 MMHG | SYSTOLIC BLOOD PRESSURE: 108 MMHG

## 2021-04-15 DIAGNOSIS — F41.9 ANXIETY: Primary | ICD-10-CM

## 2021-04-15 DIAGNOSIS — K21.00 GASTROESOPHAGEAL REFLUX DISEASE WITH ESOPHAGITIS WITHOUT HEMORRHAGE: ICD-10-CM

## 2021-04-15 PROCEDURE — 99214 OFFICE O/P EST MOD 30 MIN: CPT | Performed by: FAMILY MEDICINE

## 2021-04-15 RX ORDER — BUPROPION HYDROCHLORIDE 100 MG/1
100 TABLET, EXTENDED RELEASE ORAL 2 TIMES DAILY
Qty: 60 TABLET | Refills: 1 | Status: SHIPPED | OUTPATIENT
Start: 2021-04-15 | End: 2021-06-03 | Stop reason: SDUPTHER

## 2021-04-15 RX ORDER — FAMOTIDINE 40 MG/1
40 TABLET, FILM COATED ORAL DAILY
Qty: 90 TABLET | Refills: 1 | Status: SHIPPED | OUTPATIENT
Start: 2021-04-15 | End: 2021-05-18 | Stop reason: ALTCHOICE

## 2021-04-15 NOTE — PROGRESS NOTES
Assessment/Plan:    No problem-specific Assessment & Plan notes found for this encounter  Diagnoses and all orders for this visit:    Anxiety  Comments:  Trial of wellbutrin ordered  F/u 3-4 weeks to assess mood  Orders:  -     buPROPion (WELLBUTRIN SR) 100 mg 12 hr tablet; Take 1 tablet (100 mg total) by mouth 2 (two) times a day    Gastroesophageal reflux disease with esophagitis without hemorrhage  Comments:  Pt willing to try H2 blocker in place of PPI (d/t risk of osteopenia)  Orders:  -     famotidine (PEPCID) 40 MG tablet; Take 1 tablet (40 mg total) by mouth daily          Depression Screening and Follow-up Plan: Patient's depression screening was positive with a PHQ-2 score of 2  Their PHQ-9 score was 6  Patient assessed for underlying major depression  Brief counseling provided and recommend additional follow-up/re-evaluation next office visit  Wellbutrin started today -- f/u 3-4 wks      Return in about 4 weeks (around 5/13/2021) for Annual physical, Recheck medication/mood  The patient indicates understanding of these issues and agrees with the plan  Subjective:      Patient ID: Radha Harvey is a 35 y o  female  Patient is a 35year old with a sPMHx of depression and anxiety who presents to the office today for a follow up appointment for depression and anxiety  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[place] [address]  Since then, the patient has had several job interviews but has not been able to find any employment  The patient reports that she currently is not taking any medication for depression or anxiety  She was prescribed buspar, but stopped taking it due to insurance reasons  The patient reports that she feels anxious on a daily basis and her anxiety centers around finding stable employment   The patient reports that her mood on most days is a 6/10 (with 10 being a perfect mood), but struggles with feelings of self worth and reports that there are times throughout the day where she feels hopeless, helpless, and worthless  She fears that she is letting her family down  The patient reports that she had a good support network with her parents, friends, and boyfriend  She goes to counseling every other week and has been working on self affirmation techniques  The patient also does functional and strength exercises two times a week  The patient at this time is open to trying a medication for her depression and anxiety  Her mother has depression controlled with Wellbutrin  MISAEL-7 Flowsheet Screening      Most Recent Value   Over the last two weeks, how often have you been bothered by the following problems? Feeling nervous, anxious, or on edge  2   Not being able to stop or control worrying  1   Worrying too much about different things  0   Trouble relaxing   1   Being so restless that it's hard to sit still  2   Becoming easily annoyed or irritable   1   Feeling afraid as if something awful might happen  0   How difficult have these problems made it for you to do your work, take care of things at home, or get along with other people? Somewhat difficult   MISAEL Score   7        PHQ-9 Follow-up    Little interest or pleasure in doing things: 1 - several days  Feeling down, depressed, or hopeless: 1 - several days  Trouble falling or staying asleep, or sleeping too much: 1 - several days  Feeling tired or having little energy: 0 - not at all  Poor appetite or overeatin - not at all  Feeling bad about yourself - or that you are a failure or have let yourself or your family down: 2 - more than half the days  Trouble concentrating on things, such as reading the newspaper or watching television: 1 - several days  Moving or speaking so slowly that other people could have noticed   Or the opposite - being so fidgety or restless that you have been moving around a lot more than usual: 0 - not at all  Thoughts that you would be better off dead, or of hurting yourself in some way: 0 - not at all  PHQ-2 Score: 2  PHQ-9 Score: 6           The following portions of the patient's history were reviewed and updated as appropriate: allergies, current medications, past family history, past medical history, past social history, past surgical history and problem list       Current Outpatient Medications:     Levonorgestrel (LILETTA, 52 MG,) 18 6 MCG/DAY IUD, by Intrauterine route, Disp: , Rfl:     omeprazole (PriLOSEC) 40 MG capsule, Take 1 capsule (40 mg total) by mouth daily before breakfast, Disp: 90 capsule, Rfl: 0    buPROPion (WELLBUTRIN SR) 100 mg 12 hr tablet, Take 1 tablet (100 mg total) by mouth 2 (two) times a day, Disp: 60 tablet, Rfl: 1    famotidine (PEPCID) 40 MG tablet, Take 1 tablet (40 mg total) by mouth daily, Disp: 90 tablet, Rfl: 1      Review of Systems   Constitutional: Negative for activity change, fatigue and fever  HENT: Negative for congestion, ear pain, sinus pain and sore throat  Eyes: Negative for pain and itching  Respiratory: Negative for cough and shortness of breath  Cardiovascular: Negative for chest pain and palpitations  Gastrointestinal: Negative for abdominal pain, constipation, diarrhea, nausea and vomiting  Endocrine: Negative for cold intolerance and heat intolerance  Genitourinary: Negative for dysuria  Musculoskeletal: Negative for myalgias  Skin: Negative for color change and rash  Neurological: Negative for dizziness, syncope and headaches  Hematological: Negative for adenopathy  Psychiatric/Behavioral: Positive for decreased concentration and dysphoric mood  Negative for behavioral problems, sleep disturbance and suicidal ideas  The patient is nervous/anxious  Objective:      /78   Pulse 70   Temp 97 8 °F (36 6 °C)   Resp 16   Ht 5' 6" (1 676 m)   Wt 67 1 kg (148 lb)   SpO2 98%   BMI 23 89 kg/m²          Physical Exam  Vitals signs and nursing note reviewed     Constitutional: General: She is not in acute distress  Appearance: Normal appearance  She is well-developed  She is not ill-appearing  HENT:      Head: Normocephalic and atraumatic  Right Ear: External ear normal       Left Ear: External ear normal       Nose: Nose normal    Eyes:      General: No scleral icterus  Conjunctiva/sclera: Conjunctivae normal       Pupils: Pupils are equal, round, and reactive to light  Neck:      Musculoskeletal: Normal range of motion and neck supple  Thyroid: No thyromegaly  Cardiovascular:      Rate and Rhythm: Normal rate and regular rhythm  Heart sounds: Normal heart sounds  No murmur  Pulmonary:      Effort: Pulmonary effort is normal  No respiratory distress  Breath sounds: Normal breath sounds  No wheezing  Abdominal:      General: Bowel sounds are normal  There is no distension  Palpations: Abdomen is soft  Tenderness: There is no abdominal tenderness  Musculoskeletal: Normal range of motion  General: No tenderness  Right lower leg: No edema  Left lower leg: No edema  Lymphadenopathy:      Cervical: No cervical adenopathy  Skin:     General: Skin is warm and dry  Coloration: Skin is not jaundiced  Findings: No rash  Neurological:      General: No focal deficit present  Mental Status: She is alert and oriented to person, place, and time  Cranial Nerves: No cranial nerve deficit  Gait: Gait normal    Psychiatric:         Attention and Perception: Attention normal          Mood and Affect: Mood is anxious  Affect is tearful  Behavior: Behavior normal          Thought Content:  Thought content normal          Cognition and Memory: Cognition normal          Judgment: Judgment normal

## 2021-05-18 ENCOUNTER — OFFICE VISIT (OUTPATIENT)
Dept: FAMILY MEDICINE CLINIC | Facility: OTHER | Age: 31
End: 2021-05-18
Payer: COMMERCIAL

## 2021-05-18 VITALS
TEMPERATURE: 98.6 F | OXYGEN SATURATION: 97 % | HEART RATE: 74 BPM | DIASTOLIC BLOOD PRESSURE: 68 MMHG | BODY MASS INDEX: 23.46 KG/M2 | SYSTOLIC BLOOD PRESSURE: 92 MMHG | HEIGHT: 66 IN | WEIGHT: 146 LBS | RESPIRATION RATE: 16 BRPM

## 2021-05-18 DIAGNOSIS — Z13.0 SCREENING FOR DEFICIENCY ANEMIA: ICD-10-CM

## 2021-05-18 DIAGNOSIS — Z11.4 SCREENING FOR HIV (HUMAN IMMUNODEFICIENCY VIRUS): ICD-10-CM

## 2021-05-18 DIAGNOSIS — Z00.00 ANNUAL PHYSICAL EXAM: ICD-10-CM

## 2021-05-18 DIAGNOSIS — K21.00 GASTROESOPHAGEAL REFLUX DISEASE WITH ESOPHAGITIS WITHOUT HEMORRHAGE: ICD-10-CM

## 2021-05-18 DIAGNOSIS — Z13.1 SCREENING FOR DIABETES MELLITUS: ICD-10-CM

## 2021-05-18 DIAGNOSIS — Z23 NEED FOR VACCINATION: Primary | ICD-10-CM

## 2021-05-18 DIAGNOSIS — Z82.49 FAMILY HISTORY OF CARDIOVASCULAR DISORDER: ICD-10-CM

## 2021-05-18 PROCEDURE — 90715 TDAP VACCINE 7 YRS/> IM: CPT

## 2021-05-18 PROCEDURE — 90471 IMMUNIZATION ADMIN: CPT

## 2021-05-18 PROCEDURE — 99395 PREV VISIT EST AGE 18-39: CPT | Performed by: FAMILY MEDICINE

## 2021-05-18 PROCEDURE — 86580 TB INTRADERMAL TEST: CPT

## 2021-05-18 RX ORDER — SUCRALFATE ORAL 1 G/10ML
1 SUSPENSION ORAL
Qty: 420 ML | Refills: 1 | Status: SHIPPED | OUTPATIENT
Start: 2021-05-18 | End: 2021-06-21

## 2021-05-18 NOTE — PROGRESS NOTES
[facility] [address] [place] [address]    NAME: Danette Zamrbano  AGE: 35 y o  SEX: female  : 1990     DATE: 2021     Assessment and Plan:     Problem List Items Addressed This Visit        Digestive    Gastroesophageal reflux disease with esophagitis    Relevant Medications    sucralfate (CARAFATE) 1 g/10 mL suspension    Other Relevant Orders    Ambulatory referral to Gastroenterology      Other Visit Diagnoses     Need for vaccination    -  Primary    Relevant Orders    TDAP VACCINE GREATER THAN OR EQUAL TO 8YO IM (Completed)    TB Skin Test (Completed)    Annual physical exam        Screening for diabetes mellitus        Relevant Orders    Comprehensive metabolic panel    Family history of cardiovascular disorder        Relevant Orders    Lipid Panel with Direct LDL reflex    Screening for deficiency anemia        Relevant Orders    CBC and differential    Screening for HIV (human immunodeficiency virus)        Relevant Orders    HIV 1/2 Antigen/Antibody (4th Generation) w Reflex [facility]          Immunizations and preventive care screenings were discussed with patient today  Appropriate education was printed on patient's after visit summary  Counseling:  Alcohol/drug use: discussed moderation in alcohol intake, the recommendations for healthy alcohol use, and avoidance of illicit drug use  Dental Health: discussed importance of regular tooth brushing, flossing, and dental visits  Injury prevention: discussed safety/seat belts, safety helmets, smoke detectors, carbon dioxide detectors, and smoking near bedding or upholstery  Sexual health: discussed sexually transmitted diseases, partner selection, use of condoms, avoidance of unintended pregnancy, and contraceptive alternatives  · Exercise: the importance of regular exercise/physical activity was discussed  Recommend exercise 3-5 times per week for at least 30 minutes       Return in about 1 year (around 5/18/2022) for Annual physical or sooner as needed  The patient indicates understanding of these issues and agrees with the plan  Chief Complaint:     Chief Complaint   Patient presents with    Physical Exam      History of Present Illness:     Adult Annual Physical   Patient here for a comprehensive physical exam  The patient reports GERD is still not well controlled on daily Prilosec (40mg)  Patient reports discomfort with eating  Avoiding spicy/acidic foods and drinks  She denies nausea, vomiting, weight loss, early satiety, hematemesis or bloody stools  Last EGD 3 years ago was normal  Patient requesting GI referral for possible repeat EGD  Diet and Physical Activity  · Diet/Nutrition: well balanced diet  · Exercise: 1-2 times a week on average and 30-60 minutes on average Some cardio and strength training  Depression Screening  PHQ-9 Depression Screening    PHQ-9:   Frequency of the following problems over the past two weeks:      Little interest or pleasure in doing things: 0 - not at all  Feeling down, depressed, or hopeless: 0 - not at all  PHQ-2 Score: 0       General Health  · Sleep: sleeps well and gets 7-8 hours of sleep on average  · Hearing: normal - bilateral   · Vision: previous LASIK surgery- 10 months ago  Last eye appt 4 months ago  No vision problems at this time  · Dental: no dental visits for >1 year  Brushes teeth twice daily and flosses every other day      /GYN Health  · Last menstrual period: Cindra Pardon IUD placed 4 years- plan to have this IUD removed and replaced this November  · Contraceptive method: IUD placement  · History of STDs?: no   · Last pap smear was in November of 2018 and was normal       Review of Systems:     Review of Systems   Constitutional: Negative for chills, fatigue, fever and unexpected weight change  HENT: Negative for ear pain and sore throat  Eyes: Negative for pain and visual disturbance     Respiratory: Negative for cough, chest tightness and shortness of breath  Cardiovascular: Negative for chest pain, palpitations and leg swelling  Gastrointestinal: Negative for blood in stool, constipation, diarrhea, nausea and vomiting  Genitourinary: Negative for difficulty urinating, dysuria and hematuria  Musculoskeletal: Negative for arthralgias and back pain  Skin: Negative for color change and rash  Neurological: Negative for dizziness, seizures, syncope, light-headedness and headaches  Psychiatric/Behavioral: Negative for sleep disturbance and suicidal ideas  All other systems reviewed and are negative  Past Medical History:     Past Medical History:   Diagnosis Date    Anxiety     GERD (gastroesophageal reflux disease)       Past Surgical History:     Past Surgical History:   Procedure Laterality Date    MYRINGOTOMY        Social History:        Social History     Socioeconomic History    Marital status: Single     Spouse name: None    Number of children: None    Years of education: None    Highest education level: None   Occupational History    None   Social Needs    Financial resource strain: None    Food insecurity     Worry: None     Inability: None    Transportation needs     Medical: None     Non-medical: None   Tobacco Use    Smoking status: Never Smoker    Smokeless tobacco: Never Used   Substance and Sexual Activity    Alcohol use: Yes     Comment: socially    Drug use: No    Sexual activity: Yes     Partners: Male     Birth control/protection: I U D     Lifestyle    Physical activity     Days per week: None     Minutes per session: None    Stress: None   Relationships    Social connections     Talks on phone: None     Gets together: None     Attends Anabaptism service: None     Active member of club or organization: None     Attends meetings of clubs or organizations: None     Relationship status: None    Intimate partner violence     Fear of current or ex partner: No     Emotionally abused: No     Physically abused: No     Forced sexual activity: No   Other Topics Concern    None   Social History Narrative    None      Family History:     Family History   Problem Relation Age of Onset    Depression Mother     Hypertension Father     Cancer Paternal Grandmother     Heart attack Maternal Grandfather       Current Medications:     Current Outpatient Medications   Medication Sig Dispense Refill    buPROPion (WELLBUTRIN SR) 100 mg 12 hr tablet Take 1 tablet (100 mg total) by mouth 2 (two) times a day 60 tablet 1    Levonorgestrel (LILETTA, 52 MG,) 18 6 MCG/DAY IUD by Intrauterine route      omeprazole (PriLOSEC) 40 MG capsule Take 1 capsule (40 mg total) by mouth daily before breakfast 90 capsule 0    sucralfate (CARAFATE) 1 g/10 mL suspension Take 10 mL (1 g total) by mouth 4 (four) times a day (with meals and at bedtime) 420 mL 1     No current facility-administered medications for this visit  Allergies: Allergies   Allergen Reactions    Latex Rash      Physical Exam:     BP 92/68   Pulse 74   Temp 98 6 °F (37 °C)   Resp 16   Ht 5' 6" (1 676 m)   Wt 66 2 kg (146 lb)   SpO2 97%   BMI 23 57 kg/m²     Physical Exam  Vitals signs and nursing note reviewed  Constitutional:       General: She is not in acute distress  Appearance: Normal appearance  She is well-developed and normal weight  She is not ill-appearing, toxic-appearing or diaphoretic  HENT:      Head: Normocephalic and atraumatic  Right Ear: Tympanic membrane, ear canal and external ear normal       Left Ear: Tympanic membrane, ear canal and external ear normal       Nose: Nose normal       Mouth/Throat:      Mouth: Mucous membranes are moist       Pharynx: Oropharynx is clear  No oropharyngeal exudate or posterior oropharyngeal erythema  Eyes:      General:         Right eye: No discharge  Left eye: No discharge  Extraocular Movements: Extraocular movements intact  Conjunctiva/sclera: Conjunctivae normal    Neck:      Musculoskeletal: Normal range of motion and neck supple  No neck rigidity or muscular tenderness  Cardiovascular:      Rate and Rhythm: Normal rate and regular rhythm  Pulses: Normal pulses  Heart sounds: Normal heart sounds  No murmur  Pulmonary:      Effort: Pulmonary effort is normal  No respiratory distress  Breath sounds: Normal breath sounds  Abdominal:      General: Abdomen is flat  Bowel sounds are normal  There is no distension  Palpations: Abdomen is soft  Tenderness: There is no abdominal tenderness  Musculoskeletal: Normal range of motion  Right lower leg: No edema  Left lower leg: No edema  Lymphadenopathy:      Cervical: No cervical adenopathy  Skin:     General: Skin is warm and dry  Capillary Refill: Capillary refill takes less than 2 seconds  Neurological:      General: No focal deficit present  Mental Status: She is alert and oriented to person, place, and time  Motor: No weakness        Coordination: Coordination normal       Gait: Gait normal    Psychiatric:         Mood and Affect: Mood normal          Behavior: Behavior normal           Waleska Phoenix MD   [place] [id number]

## 2021-05-20 ENCOUNTER — CLINICAL SUPPORT (OUTPATIENT)
Dept: FAMILY MEDICINE CLINIC | Facility: OTHER | Age: 31
End: 2021-05-20

## 2021-05-20 DIAGNOSIS — Z11.1 ENCOUNTER FOR PPD SKIN TEST READING: Primary | ICD-10-CM

## 2021-05-20 LAB
INDURATION: 0 MM
TB SKIN TEST: NEGATIVE

## 2021-05-20 PROCEDURE — RECHECK

## 2021-06-01 ENCOUNTER — CLINICAL SUPPORT (OUTPATIENT)
Dept: FAMILY MEDICINE CLINIC | Facility: OTHER | Age: 31
End: 2021-06-01
Payer: COMMERCIAL

## 2021-06-01 DIAGNOSIS — Z11.1 ENCOUNTER FOR PPD TEST: Primary | ICD-10-CM

## 2021-06-01 PROCEDURE — 86580 TB INTRADERMAL TEST: CPT

## 2021-06-03 ENCOUNTER — CLINICAL SUPPORT (OUTPATIENT)
Dept: FAMILY MEDICINE CLINIC | Facility: OTHER | Age: 31
End: 2021-06-03

## 2021-06-03 DIAGNOSIS — Z11.1 ENCOUNTER FOR PPD SKIN TEST READING: Primary | ICD-10-CM

## 2021-06-03 DIAGNOSIS — F41.9 ANXIETY: ICD-10-CM

## 2021-06-03 LAB
INDURATION: 0 MM
TB SKIN TEST: NEGATIVE

## 2021-06-03 PROCEDURE — RECHECK

## 2021-06-03 RX ORDER — BUPROPION HYDROCHLORIDE 100 MG/1
100 TABLET, EXTENDED RELEASE ORAL 2 TIMES DAILY
Qty: 60 TABLET | Refills: 3 | Status: SHIPPED | OUTPATIENT
Start: 2021-06-03 | End: 2021-06-21 | Stop reason: SDUPTHER

## 2021-06-21 ENCOUNTER — OFFICE VISIT (OUTPATIENT)
Dept: FAMILY MEDICINE CLINIC | Facility: OTHER | Age: 31
End: 2021-06-21
Payer: COMMERCIAL

## 2021-06-21 VITALS
BODY MASS INDEX: 23.56 KG/M2 | SYSTOLIC BLOOD PRESSURE: 114 MMHG | TEMPERATURE: 98.2 F | HEART RATE: 88 BPM | WEIGHT: 146.6 LBS | DIASTOLIC BLOOD PRESSURE: 64 MMHG | HEIGHT: 66 IN | OXYGEN SATURATION: 98 % | RESPIRATION RATE: 18 BRPM

## 2021-06-21 DIAGNOSIS — F41.9 ANXIETY: Primary | ICD-10-CM

## 2021-06-21 DIAGNOSIS — K21.00 GASTROESOPHAGEAL REFLUX DISEASE WITH ESOPHAGITIS: ICD-10-CM

## 2021-06-21 DIAGNOSIS — F32.9 REACTIVE DEPRESSION: ICD-10-CM

## 2021-06-21 PROBLEM — F32.A DEPRESSION: Status: ACTIVE | Noted: 2021-06-21

## 2021-06-21 PROCEDURE — 99214 OFFICE O/P EST MOD 30 MIN: CPT | Performed by: FAMILY MEDICINE

## 2021-06-21 RX ORDER — OMEPRAZOLE 40 MG/1
40 CAPSULE, DELAYED RELEASE ORAL
Qty: 90 CAPSULE | Refills: 1 | Status: SHIPPED | OUTPATIENT
Start: 2021-06-21 | End: 2022-07-13

## 2021-06-21 RX ORDER — BUPROPION HYDROCHLORIDE 150 MG/1
150 TABLET, EXTENDED RELEASE ORAL 2 TIMES DAILY
Qty: 90 TABLET | Refills: 1 | Status: SHIPPED | OUTPATIENT
Start: 2021-06-21 | End: 2022-07-13

## 2021-06-21 NOTE — ASSESSMENT & PLAN NOTE
Patient was on omeprazole for GERD previously and then stopped it to trial famotidine  However reflux symptoms have returned  · Stop famotidine  · Restart omeprazole 40 mg daily before breakfast  · Instructed patient to avoid foods such as lemon, tomatoes, fatty foods, and spicy foods   Avoid laying down immediately after eating

## 2021-06-21 NOTE — PROGRESS NOTES
Assessment/Plan:    Depression  Patient does not display signs or symptoms of depression at this time  Her mood has been well controlled with buproprion that was started about 2 weeks  She has no active thoughts of wanting to hurt herself or others  · Increase dose of bupropion to 150 mg BID daily to help decrease persistent anxiety symptoms    Anxiety  Persistent symptoms of anxiety  · Increase dose of buproprion 150 mg BID daily    Gastroesophageal reflux disease with esophagitis  Patient was on omeprazole for GERD previously and then stopped it to trial famotidine  However reflux symptoms have returned  · Stop famotidine  · Restart omeprazole 40 mg daily before breakfast  · Instructed patient to avoid foods such as lemon, tomatoes, fatty foods, and spicy foods  Avoid laying down immediately after eating       Diagnoses and all orders for this visit:    Anxiety  -     buPROPion (WELLBUTRIN SR) 150 mg 12 hr tablet; Take 1 tablet (150 mg total) by mouth 2 (two) times a day    Reactive depression  -     buPROPion (WELLBUTRIN SR) 150 mg 12 hr tablet; Take 1 tablet (150 mg total) by mouth 2 (two) times a day    Gastroesophageal reflux disease with esophagitis  -     omeprazole (PriLOSEC) 40 MG capsule; Take 1 capsule (40 mg total) by mouth daily before breakfast    Other orders  -     Cancel: Hepatitis C antibody; Future          Subjective:      Patient ID: Natalee Trinh is a 35 y o  female  This is a 35year old female with PMH of GERD, depression, and anxiety who presents to the office today for a follow up appointment for her buproprion medication  She recently started taking buproprion for depression and anxiety about 2-3 weeks ago  At the time she had recently lost her job and was struggling with low mood and anxiety  She feels that the buproprion has helped significantly with her low mood and depression symptoms   She also recently got a new job which has helped significantly with boosting her mood and feelings of self worth  She is not having depressive symptoms at this time  She having any problems with increased or decreased sleep  Her appetite has not changed  She does not have decreased interest in activities, low mood, feelings of guilt, or hopelessness  She does not have suicidal or homicidal ideation  In terms of side effects of the buproprion she has had vivid dreams at night but these are not bothersome to her  She also notes a low volume ringing in her ears when in a quiet room  She has not had any nausea, vomiting, sleeplessness, dizziness or flushing  She states that she occasionally continues to feel anxious  She states that she has episodes about 1-2 times per week where her heart starts racing and she feels anxious  She does not have shortness of breath, chest pain, palpitations, or numbness or tingling during these episodes  She is interested in either increasing her dose of buproprion or adding another medication for anxiety  The following portions of the patient's history were reviewed and updated as appropriate: current medications, past medical history and problem list     Review of Systems   Constitutional: Negative for appetite change, chills, diaphoresis, fatigue and fever  HENT: Negative for congestion, ear pain, sore throat, tinnitus and trouble swallowing  Eyes: Negative for pain and visual disturbance  Respiratory: Negative for cough and shortness of breath  Cardiovascular: Negative for chest pain, palpitations and leg swelling  Gastrointestinal: Negative for abdominal pain, constipation, diarrhea, nausea and vomiting  Neurological: Negative for dizziness, light-headedness, numbness and headaches  Psychiatric/Behavioral: Positive for sleep disturbance  Negative for dysphoric mood, self-injury and suicidal ideas  The patient is nervous/anxious  Vivid dreams   All other systems reviewed and are negative          Objective:    /64   Pulse 88   Temp 98 2 °F (36 8 °C)   Resp 18   Ht 5' 6" (1 676 m)   Wt 66 5 kg (146 lb 9 6 oz)   SpO2 98%   BMI 23 66 kg/m²        Physical Exam  Constitutional:       General: She is not in acute distress  Appearance: Normal appearance  She is normal weight  She is not ill-appearing, toxic-appearing or diaphoretic  HENT:      Head: Normocephalic and atraumatic  Right Ear: Tympanic membrane, ear canal and external ear normal       Left Ear: Tympanic membrane, ear canal and external ear normal       Nose: Nose normal  No congestion or rhinorrhea  Mouth/Throat:      Mouth: Mucous membranes are moist       Pharynx: Oropharynx is clear  No oropharyngeal exudate or posterior oropharyngeal erythema  Eyes:      General: No scleral icterus  Right eye: No discharge  Left eye: No discharge  Extraocular Movements: Extraocular movements intact  Pupils: Pupils are equal, round, and reactive to light  Cardiovascular:      Rate and Rhythm: Normal rate and regular rhythm  Pulses: Normal pulses  Heart sounds: Normal heart sounds  No murmur heard  No friction rub  No gallop  Pulmonary:      Effort: Pulmonary effort is normal  No respiratory distress  Breath sounds: Normal breath sounds  No stridor  No wheezing, rhonchi or rales  Abdominal:      General: Abdomen is flat  Bowel sounds are normal  There is no distension  Palpations: Abdomen is soft  Tenderness: There is no abdominal tenderness  There is no guarding  Musculoskeletal:      Right lower leg: No edema  Left lower leg: No edema  Lymphadenopathy:      Cervical: No cervical adenopathy  Skin:     General: Skin is warm and dry  Coloration: Skin is not jaundiced  Neurological:      General: No focal deficit present  Mental Status: She is alert and oriented to person, place, and time     Psychiatric:         Mood and Affect: Mood normal          Behavior: Behavior normal          Thought Content: Thought content normal          Judgment: Judgment normal

## 2021-06-21 NOTE — ASSESSMENT & PLAN NOTE
Patient does not display signs or symptoms of depression at this time  Her mood has been well controlled with buproprion that was started about 2 weeks  She has no active thoughts of wanting to hurt herself or others     · Increase dose of bupropion to 150 mg BID daily to help decrease persistent anxiety symptoms

## 2021-06-23 ENCOUNTER — OFFICE VISIT (OUTPATIENT)
Dept: URGENT CARE | Facility: CLINIC | Age: 31
End: 2021-06-23
Payer: COMMERCIAL

## 2021-06-23 VITALS
SYSTOLIC BLOOD PRESSURE: 124 MMHG | DIASTOLIC BLOOD PRESSURE: 86 MMHG | OXYGEN SATURATION: 99 % | WEIGHT: 145 LBS | TEMPERATURE: 98.9 F | HEART RATE: 72 BPM | RESPIRATION RATE: 18 BRPM | BODY MASS INDEX: 23.3 KG/M2 | HEIGHT: 66 IN

## 2021-06-23 DIAGNOSIS — R30.0 DYSURIA: Primary | ICD-10-CM

## 2021-06-23 LAB
SL AMB  POCT GLUCOSE, UA: NEGATIVE
SL AMB LEUKOCYTE ESTERASE,UA: NEGATIVE
SL AMB POCT BILIRUBIN,UA: NEGATIVE
SL AMB POCT BLOOD,UA: NEGATIVE
SL AMB POCT CLARITY,UA: CLEAR
SL AMB POCT COLOR,UA: YELLOW
SL AMB POCT KETONES,UA: NEGATIVE
SL AMB POCT NITRITE,UA: NEGATIVE
SL AMB POCT PH,UA: 6.5
SL AMB POCT SPECIFIC GRAVITY,UA: 1
SL AMB POCT URINE PROTEIN: NEGATIVE
SL AMB POCT UROBILINOGEN: 0.2

## 2021-06-23 PROCEDURE — 99213 OFFICE O/P EST LOW 20 MIN: CPT | Performed by: PHYSICIAN ASSISTANT

## 2021-06-23 PROCEDURE — 81002 URINALYSIS NONAUTO W/O SCOPE: CPT | Performed by: PHYSICIAN ASSISTANT

## 2021-06-23 PROCEDURE — 87086 URINE CULTURE/COLONY COUNT: CPT | Performed by: PHYSICIAN ASSISTANT

## 2021-06-23 RX ORDER — NITROFURANTOIN 25; 75 MG/1; MG/1
100 CAPSULE ORAL 2 TIMES DAILY
Qty: 10 CAPSULE | Refills: 0 | Status: SHIPPED | OUTPATIENT
Start: 2021-06-23 | End: 2021-06-28

## 2021-06-23 NOTE — PROGRESS NOTES
[address] [facility]        NAME: Josh Cazares is a 35 y o  female  : 1990    MRN: [id number]  DATE: 2021  TIME: 6:23 PM    Assessment and Plan   Dysuria [R30 0]  1  Dysuria  POCT urine dip    nitrofurantoin (MACROBID) 100 mg capsule    Urine culture         Patient Instructions     Take antibiotic as directed with food  Recommend probiotics for side effects  Continue with over-the-counter symptom relief  Monitor for worsening symptoms    Follow up with PCP in 3-5 days  Proceed to  ER if symptoms worsen  Chief Complaint     Chief Complaint   Patient presents with    Urinary Tract Infection     started 2 nights ago, with frequency, urgency, burning with urination, itchiness  no abdominal or flank pain, no meds taken, no fevers or cold symptoms, pt has hx of UTI and yeast infections          History of Present Illness       Patient presents with complaint of urinary symptoms x 2 days  She states that she started with pelvic pressure and has since developed dysuria, itching, urgency, and increased frequency  She denies fever, chills, sweats, nausea, vomiting, diarrhea, abdominal pain, flank and back pain  She reports that she recently started a new job which requires lots of driving and states that she has been hydrating and urinating less  She reports having a UTI about every other year  She denies recent antibiotic use and known antibiotic use  Review of Systems   Review of Systems   Constitutional: Negative for chills and fever  HENT: Negative for ear pain and sore throat  Eyes: Negative for pain and visual disturbance  Respiratory: Negative for cough and shortness of breath  Cardiovascular: Negative for chest pain and palpitations  Gastrointestinal: Negative for abdominal pain and vomiting  Genitourinary: Positive for dysuria, frequency, pelvic pain and urgency  Negative for hematuria  Musculoskeletal: Negative for arthralgias and back pain     Skin: Negative for color change and rash  Neurological: Negative for seizures and syncope  All other systems reviewed and are negative  Current Medications       Current Outpatient Medications:     buPROPion (WELLBUTRIN SR) 150 mg 12 hr tablet, Take 1 tablet (150 mg total) by mouth 2 (two) times a day, Disp: 90 tablet, Rfl: 1    Levonorgestrel (LILETTA, 52 MG,) 18 6 MCG/DAY IUD, by Intrauterine route, Disp: , Rfl:     omeprazole (PriLOSEC) 40 MG capsule, Take 1 capsule (40 mg total) by mouth daily before breakfast, Disp: 90 capsule, Rfl: 1    nitrofurantoin (MACROBID) 100 mg capsule, Take 1 capsule (100 mg total) by mouth 2 (two) times a day for 5 days, Disp: 10 capsule, Rfl: 0    Current Allergies     Allergies as of 06/23/2021 - Reviewed 06/23/2021   Allergen Reaction Noted    Latex Rash 02/02/2018            The following portions of the patient's history were reviewed and updated as appropriate: allergies, current medications, past family history, past medical history, past social history, past surgical history and problem list      Past Medical History:   Diagnosis Date    Anxiety     GERD (gastroesophageal reflux disease)        Past Surgical History:   Procedure Laterality Date    MYRINGOTOMY         Family History   Problem Relation Age of Onset    Depression Mother     Hypertension Father     Cancer Paternal Grandmother     Heart attack Maternal Grandfather          Medications have been verified  Objective   /86   Pulse 72   Temp 98 9 °F (37 2 °C) (Tympanic)   Resp 18   Ht 5' 6" (1 676 m)   Wt 65 8 kg (145 lb)   SpO2 99%   BMI 23 40 kg/m²   No LMP recorded  Patient has had an implant  Physical Exam     Physical Exam  Vitals and nursing note reviewed  Constitutional:       General: She is not in acute distress  Appearance: Normal appearance  She is well-developed  She is not ill-appearing or diaphoretic  HENT:      Head: Normocephalic and atraumatic     Eyes: Conjunctiva/sclera: Conjunctivae normal       Pupils: Pupils are equal, round, and reactive to light  Cardiovascular:      Rate and Rhythm: Normal rate and regular rhythm  Heart sounds: Normal heart sounds  Pulmonary:      Effort: Pulmonary effort is normal  No respiratory distress  Breath sounds: Normal breath sounds  No stridor  No wheezing, rhonchi or rales  Abdominal:      Tenderness: There is no right CVA tenderness or left CVA tenderness  Musculoskeletal:      Cervical back: Normal range of motion and neck supple  Lymphadenopathy:      Cervical: No cervical adenopathy  Skin:     General: Skin is warm and dry  Capillary Refill: Capillary refill takes less than 2 seconds  Findings: No rash  Neurological:      Mental Status: She is alert and oriented to person, place, and time  Cranial Nerves: No cranial nerve deficit  Sensory: No sensory deficit  Psychiatric:         Behavior: Behavior normal          Thought Content:  Thought content normal

## 2021-06-26 LAB — BACTERIA UR CULT: ABNORMAL

## 2021-12-20 ENCOUNTER — ANNUAL EXAM (OUTPATIENT)
Dept: OBGYN CLINIC | Facility: CLINIC | Age: 31
End: 2021-12-20
Payer: COMMERCIAL

## 2021-12-20 VITALS
DIASTOLIC BLOOD PRESSURE: 76 MMHG | WEIGHT: 147.4 LBS | SYSTOLIC BLOOD PRESSURE: 120 MMHG | HEIGHT: 66 IN | BODY MASS INDEX: 23.69 KG/M2

## 2021-12-20 DIAGNOSIS — Z01.419 ENCOUNTER FOR GYNECOLOGICAL EXAMINATION WITHOUT ABNORMAL FINDING: Primary | ICD-10-CM

## 2021-12-20 PROCEDURE — S0610 ANNUAL GYNECOLOGICAL EXAMINA: HCPCS | Performed by: PHYSICIAN ASSISTANT

## 2021-12-20 PROCEDURE — G0145 SCR C/V CYTO,THINLAYER,RESCR: HCPCS | Performed by: PHYSICIAN ASSISTANT

## 2021-12-20 PROCEDURE — G0476 HPV COMBO ASSAY CA SCREEN: HCPCS | Performed by: PHYSICIAN ASSISTANT

## 2021-12-21 LAB
HPV HR 12 DNA CVX QL NAA+PROBE: NEGATIVE
HPV16 DNA CVX QL NAA+PROBE: NEGATIVE
HPV18 DNA CVX QL NAA+PROBE: NEGATIVE

## 2021-12-29 LAB
LAB AP GYN PRIMARY INTERPRETATION: NORMAL
Lab: NORMAL

## 2022-07-13 ENCOUNTER — OFFICE VISIT (OUTPATIENT)
Dept: FAMILY MEDICINE CLINIC | Facility: OTHER | Age: 32
End: 2022-07-13
Payer: COMMERCIAL

## 2022-07-13 VITALS
SYSTOLIC BLOOD PRESSURE: 120 MMHG | WEIGHT: 145 LBS | HEIGHT: 66 IN | OXYGEN SATURATION: 98 % | RESPIRATION RATE: 16 BRPM | HEART RATE: 64 BPM | BODY MASS INDEX: 23.3 KG/M2 | DIASTOLIC BLOOD PRESSURE: 78 MMHG | TEMPERATURE: 97.8 F

## 2022-07-13 DIAGNOSIS — Z13.6 SCREENING FOR CARDIOVASCULAR CONDITION: ICD-10-CM

## 2022-07-13 DIAGNOSIS — Z13.1 SCREENING FOR DIABETES MELLITUS: ICD-10-CM

## 2022-07-13 DIAGNOSIS — F32.9 REACTIVE DEPRESSION: ICD-10-CM

## 2022-07-13 DIAGNOSIS — Z00.00 ANNUAL PHYSICAL EXAM: Primary | ICD-10-CM

## 2022-07-13 DIAGNOSIS — Z11.4 SCREENING FOR HIV (HUMAN IMMUNODEFICIENCY VIRUS): ICD-10-CM

## 2022-07-13 DIAGNOSIS — Z11.59 NEED FOR HEPATITIS C SCREENING TEST: ICD-10-CM

## 2022-07-13 PROCEDURE — 3725F SCREEN DEPRESSION PERFORMED: CPT

## 2022-07-13 PROCEDURE — 99395 PREV VISIT EST AGE 18-39: CPT

## 2022-07-13 RX ORDER — SERTRALINE HYDROCHLORIDE 25 MG/1
25 TABLET, FILM COATED ORAL DAILY
Qty: 30 TABLET | Refills: 5 | Status: SHIPPED | OUTPATIENT
Start: 2022-07-13 | End: 2022-08-17 | Stop reason: SDUPTHER

## 2022-07-13 NOTE — ASSESSMENT & PLAN NOTE
Patient feels she has recently been struggling with mood and associated symptoms  Starting 25mg sertraline today, will recheck mood at follow up in 4 weeks

## 2022-07-13 NOTE — PATIENT INSTRUCTIONS

## 2022-07-13 NOTE — PROGRESS NOTES
[address] [place]    NAME: Karli Toro  AGE: 28 y o  SEX: female  : 1990     DATE: 2022     Assessment and Plan:     Problem List Items Addressed This Visit        Other    Depression     Patient feels she has recently been struggling with mood and associated symptoms  Starting 25mg sertraline today, will recheck mood at follow up in 4 weeks  Relevant Medications    sertraline (Zoloft) 25 mg tablet    Other Relevant Orders    TSH, 3rd generation      Other Visit Diagnoses     Annual physical exam    -  Primary    Need for hepatitis C screening test        Relevant Orders    Hepatitis C Antibody ([facility])    Screening for HIV (human immunodeficiency virus)        Relevant Orders    HIV 1/2 Antigen/Antibody (4th Generation) w Reflex [facility]    Screening for cardiovascular condition        Relevant Orders    Lipid panel    Screening for diabetes mellitus        Relevant Orders    Comprehensive metabolic panel          Immunizations and preventive care screenings were discussed with patient today  Appropriate education was printed on patient's after visit summary  Counseling:  Alcohol/drug use: discussed moderation in alcohol intake, the recommendations for healthy alcohol use, and avoidance of illicit drug use  Dental Health: discussed importance of regular tooth brushing, flossing, and dental visits  Injury prevention: discussed safety/seat belts, safety helmets, smoke detectors, carbon dioxide detectors, and smoking near bedding or upholstery  Sexual health: discussed sexually transmitted diseases, partner selection, use of condoms, avoidance of unintended pregnancy, and contraceptive alternatives  Exercise: the importance of regular exercise/physical activity was discussed  Recommend exercise 3-5 times per week for at least 30 minutes            Return in about 4 weeks (around 8/10/2022) for Recheck depression/anxiety  Chief Complaint:     Chief Complaint   Patient presents with    Physical Exam      History of Present Illness:     Adult Annual Physical   Patient here for a comprehensive physical exam  The patient reports problems - patient feels she is sleeping more than she would like to, sometimes gets agitated more easily, is more emotional, and struggles with feeling down in general     Diet and Physical Activity  Diet/Nutrition: well balanced diet, limited junk food, limited fruits/vegetables, adequate fiber intake and adequate whole grain intake  Exercise: moderate cardiovascular exercise, strength training exercises, 3-4 times a week on average and 30-60 minutes on average  Depression Screening  PHQ-2/9 Depression Screening         General Health  Sleep: sleeps well, gets more than 8 hours of sleep on average and has some difficulty getting out of bed in the morning, feels the time she goes to bed does not have an impact on the amount she is able to sleep  Hearing: normal - bilateral and hears some ringing in ears when quiet  Vision: previous LASIK surgery  Dental: regular dental visits, brushes teeth twice daily and flosses teeth occasionally  /GYN Health  Last menstrual period: usually fairly light, LMP on 7/9/22  Contraceptive method: IUD placement, needs new IUD placed but is having trouble covered by insurance  History of STDs?: no      Review of Systems:     Review of Systems   Constitutional: Negative for fever and unexpected weight change  HENT: Positive for tinnitus (ringing in ears when quiet)  Negative for congestion, rhinorrhea and sore throat  Eyes: Positive for visual disturbance (sees some floaters commonly)  Respiratory: Negative for cough and shortness of breath  Cardiovascular: Negative for chest pain, palpitations and leg swelling  Gastrointestinal: Positive for blood in stool (occasionally, trace)   Negative for abdominal pain, constipation, diarrhea, nausea and vomiting  Genitourinary: Negative for hematuria  Musculoskeletal: Positive for back pain (chronic)  Skin: Negative for rash and wound  Neurological: Negative for dizziness, syncope and weakness  Psychiatric/Behavioral: Negative for sleep disturbance  The patient is nervous/anxious  Past Medical History:     Past Medical History:   Diagnosis Date    Anxiety     GERD (gastroesophageal reflux disease)       Past Surgical History:     Past Surgical History:   Procedure Laterality Date    MYRINGOTOMY        Social History:     Social History     Socioeconomic History    Marital status: Single     Spouse name: None    Number of children: None    Years of education: None    Highest education level: None   Occupational History    None   Tobacco Use    Smoking status: Never Smoker    Smokeless tobacco: Never Used   Vaping Use    Vaping Use: Never used   Substance and Sexual Activity    Alcohol use: Yes     Comment: socially    Drug use: No    Sexual activity: Yes     Partners: Male     Birth control/protection: I U D     Other Topics Concern    None   Social History Narrative    None     Social Determinants of Health     Financial Resource Strain: Not on file   Food Insecurity: Not on file   Transportation Needs: Not on file   Physical Activity: Not on file   Stress: Not on file   Social Connections: Not on file   Intimate Partner Violence: Not on file   Housing Stability: Not on file      Family History:     Family History   Problem Relation Age of Onset    Depression Mother     Hypertension Father     Cancer Paternal Grandmother     Heart attack Maternal Grandfather       Current Medications:     Current Outpatient Medications   Medication Sig Dispense Refill    levonorgestrel (LILETTA) 18 6 MCG/DAY intrauterine device by Intrauterine route      sertraline (Zoloft) 25 mg tablet Take 1 tablet (25 mg total) by mouth daily 30 tablet 5     No current facility-administered medications for this visit  Allergies: Allergies   Allergen Reactions    Latex Rash      Physical Exam:     /78   Pulse 64   Temp 97 8 °F (36 6 °C)   Resp 16   Ht 5' 6" (1 676 m)   Wt 65 8 kg (145 lb)   SpO2 98%   BMI 23 40 kg/m²     Physical Exam  Constitutional:       Appearance: Normal appearance  HENT:      Right Ear: Tympanic membrane, ear canal and external ear normal       Left Ear: Tympanic membrane, ear canal and external ear normal       Nose: Nose normal  No congestion or rhinorrhea  Mouth/Throat:      Mouth: Mucous membranes are moist       Pharynx: Oropharynx is clear  No oropharyngeal exudate or posterior oropharyngeal erythema  Eyes:      General: No scleral icterus  Extraocular Movements: Extraocular movements intact  Conjunctiva/sclera: Conjunctivae normal       Pupils: Pupils are equal, round, and reactive to light  Cardiovascular:      Rate and Rhythm: Normal rate and regular rhythm  Pulses: Normal pulses  Heart sounds: Normal heart sounds  Pulmonary:      Effort: Pulmonary effort is normal  No respiratory distress  Breath sounds: Normal breath sounds  No wheezing, rhonchi or rales  Abdominal:      General: Abdomen is flat  Bowel sounds are normal       Palpations: Abdomen is soft  There is no mass  Tenderness: There is no abdominal tenderness  Musculoskeletal:         General: No swelling, tenderness or deformity  Normal range of motion  Cervical back: Normal range of motion and neck supple  Right lower leg: No edema  Left lower leg: No edema  Skin:     General: Skin is warm and dry  Capillary Refill: Capillary refill takes less than 2 seconds  Coloration: Skin is not jaundiced  Findings: No rash  Neurological:      General: No focal deficit present  Mental Status: She is alert and oriented to person, place, and time  Mental status is at baseline        Deep Tendon Reflexes: Reflexes normal    Psychiatric:         Behavior: Behavior normal       Comments: Patient is intermittently tearful          DO Melissa Rodgers

## 2022-08-17 ENCOUNTER — OFFICE VISIT (OUTPATIENT)
Dept: FAMILY MEDICINE CLINIC | Facility: OTHER | Age: 32
End: 2022-08-17
Payer: COMMERCIAL

## 2022-08-17 VITALS
WEIGHT: 147.2 LBS | DIASTOLIC BLOOD PRESSURE: 76 MMHG | BODY MASS INDEX: 23.66 KG/M2 | HEART RATE: 64 BPM | TEMPERATURE: 98 F | RESPIRATION RATE: 18 BRPM | HEIGHT: 66 IN | SYSTOLIC BLOOD PRESSURE: 108 MMHG

## 2022-08-17 DIAGNOSIS — F32.9 REACTIVE DEPRESSION: ICD-10-CM

## 2022-08-17 PROCEDURE — 99213 OFFICE O/P EST LOW 20 MIN: CPT

## 2022-08-17 PROCEDURE — 3725F SCREEN DEPRESSION PERFORMED: CPT

## 2022-08-17 RX ORDER — SERTRALINE HYDROCHLORIDE 25 MG/1
25 TABLET, FILM COATED ORAL DAILY
Qty: 90 TABLET | Refills: 1 | Status: SHIPPED | OUTPATIENT
Start: 2022-08-17

## 2022-11-08 DIAGNOSIS — F32.9 REACTIVE DEPRESSION: ICD-10-CM

## 2022-11-08 RX ORDER — SERTRALINE HYDROCHLORIDE 25 MG/1
25 TABLET, FILM COATED ORAL DAILY
Qty: 90 TABLET | Refills: 1 | Status: SHIPPED | OUTPATIENT
Start: 2022-11-08

## 2022-12-23 ENCOUNTER — ANNUAL EXAM (OUTPATIENT)
Dept: OBGYN CLINIC | Facility: CLINIC | Age: 32
End: 2022-12-23

## 2022-12-23 VITALS
DIASTOLIC BLOOD PRESSURE: 80 MMHG | WEIGHT: 152.6 LBS | BODY MASS INDEX: 24.53 KG/M2 | HEIGHT: 66 IN | SYSTOLIC BLOOD PRESSURE: 102 MMHG

## 2022-12-23 DIAGNOSIS — Z01.419 ENCOUNTER FOR GYNECOLOGICAL EXAMINATION WITHOUT ABNORMAL FINDING: Primary | ICD-10-CM

## 2022-12-23 NOTE — PROGRESS NOTES
Assessment/Plan:    No problem-specific Assessment & Plan notes found for this encounter  Diagnoses and all orders for this visit:    Encounter for gynecological examination without abnormal finding  -     Liquid-based pap, screening        Pap done  Rexine Furl now approved for 8 years  Call if periods worsen or change  If no problems, patient to return in 1 year for routine gyn care  Subjective:      Patient ID: Nicolas Kemp is a 33 y o  female  Patient is here for yearly gyn exam   States she is doing well overall  Had Rexine Furl IUD placed December 2017  Periods are regular once a month, and bleeding lasts for 5 days  Patient denies heavy bleeding, severe cramping, HA, and mood symptoms  She is sexually active with a monogamous partner  Denies bowel/bladder changes, pelvic pain, bloating, abdominal pain, n/v, change in appetite, and thyroid disease  Patient is performing self-breast exam   Denies new masses, skin changes, nipple discharge, and pain/tenderness  The following portions of the patient's history were reviewed and updated as appropriate: allergies, current medications, past family history, past medical history, past social history, past surgical history and problem list     Review of Systems   Constitutional: Negative for appetite change and unexpected weight change  Cardiovascular:        No masses, skin changes, nipple discharge, and pain/tenderness  Gastrointestinal: Negative for abdominal distention, abdominal pain, constipation, diarrhea, nausea and vomiting  Genitourinary: Negative for difficulty urinating, dysuria, frequency, genital sores, hematuria, menstrual problem, pelvic pain, urgency, vaginal bleeding, vaginal discharge and vaginal pain           Objective:      /80 (BP Location: Left arm, Patient Position: Sitting, Cuff Size: Adult)   Ht 5' 6" (1 676 m)   Wt 69 2 kg (152 lb 9 6 oz)   LMP 12/18/2022 (Exact Date)   BMI 24 63 kg/m²          Physical Exam  Vitals reviewed  Exam conducted with a chaperone present  Constitutional:       Appearance: Normal appearance  She is well-developed and normal weight  Neck:      Thyroid: No thyromegaly  Pulmonary:      Effort: Pulmonary effort is normal    Chest:   Breasts:     Breasts are symmetrical       Right: Normal  No swelling, bleeding, inverted nipple, mass, nipple discharge, skin change or tenderness  Left: Normal  No swelling, bleeding, inverted nipple, mass, nipple discharge, skin change or tenderness  Abdominal:      General: Abdomen is flat  There is no distension  Palpations: Abdomen is soft  Tenderness: There is no abdominal tenderness  Genitourinary:     General: Normal vulva  Pubic Area: No rash  Labia:         Right: No rash, tenderness, lesion or injury  Left: No rash, tenderness, lesion or injury  Vagina: Normal  No vaginal discharge, erythema, tenderness or bleeding  Cervix: Normal       Uterus: Normal        Adnexa: Right adnexa normal and left adnexa normal         Right: No mass, tenderness or fullness  Left: No mass, tenderness or fullness  Comments: IUD strings visualized  Musculoskeletal:      Cervical back: Neck supple  Lymphadenopathy:      Cervical: No cervical adenopathy  Upper Body:      Right upper body: No supraclavicular or axillary adenopathy  Left upper body: No supraclavicular or axillary adenopathy  Lower Body: No right inguinal adenopathy  No left inguinal adenopathy  Skin:     General: Skin is warm and dry  Neurological:      Mental Status: She is alert and oriented to person, place, and time  Psychiatric:         Mood and Affect: Mood normal          Behavior: Behavior normal  Behavior is cooperative  Thought Content:  Thought content normal          Judgment: Judgment normal

## 2022-12-30 LAB
LAB AP GYN PRIMARY INTERPRETATION: NORMAL
Lab: NORMAL

## 2023-01-17 ENCOUNTER — RA CDI HCC (OUTPATIENT)
Dept: OTHER | Facility: HOSPITAL | Age: 33
End: 2023-01-17

## 2023-01-17 NOTE — PROGRESS NOTES
F32A    NOT on the BPA- please assess using MEAT for 2023 billing    [facility] [address]  coding opportunities          Chart Reviewed number of suggestions sent to Provider: 1     Patients Insurance        Commercial Insurance: [address] [place] [address]

## 2023-01-24 ENCOUNTER — TELEMEDICINE (OUTPATIENT)
Dept: FAMILY MEDICINE CLINIC | Facility: OTHER | Age: 33
End: 2023-01-24

## 2023-01-24 DIAGNOSIS — F32.9 REACTIVE DEPRESSION: ICD-10-CM

## 2023-01-24 RX ORDER — SERTRALINE HYDROCHLORIDE 25 MG/1
25 TABLET, FILM COATED ORAL DAILY
Qty: 90 TABLET | Refills: 1 | Status: SHIPPED | OUTPATIENT
Start: 2023-01-24

## 2023-01-24 NOTE — PROGRESS NOTES
Name: Maritza Kehr      : 1990      MRN: [id number]  Encounter Provider: Isaiah Chen DO  Encounter Date: 2023   Encounter department: [address] [place] [address] Dr Bowie  Reactive depression           Subjective      Orvil People states that she is having improvement in her mood  She states that she still has stressors in her life but zoloft has been  She is in the choir and feels enjoyment with that  In the moment of an acute stressor, she takes a moment to herself to collect her     Review of Systems   Constitutional: Negative for chills, fever and unexpected weight change  HENT: Negative for congestion and sore throat  Eyes: Negative for pain and visual disturbance  Respiratory: Negative for chest tightness and shortness of breath  Cardiovascular: Negative for chest pain and palpitations  Gastrointestinal: Negative for abdominal pain and nausea  Genitourinary: Negative for difficulty urinating and dysuria  Neurological: Negative for tremors, seizures, syncope and weakness  Psychiatric/Behavioral: Negative for self-injury, sleep disturbance and suicidal ideas  The patient is not nervous/anxious  Current Outpatient Medications on File Prior to Visit   Medication Sig   • levonorgestrel (LILETTA) 18 6 MCG/DAY intrauterine device by Intrauterine route   • sertraline (ZOLOFT) 25 mg tablet TAKE 1 TABLET (25 MG TOTAL) BY MOUTH DAILY       Objective     There were no vitals taken for this visit      Physical Exam  Isaiah Chen DO

## 2023-01-25 NOTE — PROGRESS NOTES
Virtual Regular Visit    Verification of patient location:    Patient is located in the following state in which I hold an active license [place]      Assessment/Plan:    Problem List Items Addressed This Visit        Other    Depression    Relevant Medications    sertraline (ZOLOFT) 25 mg tablet  Zainab Russo reports improvement in her symptoms and tat current sertraline dose is helpful  PHQ 9 improved today  Continue current dose of sertraline  Also discussed lifestyle modifications that she can use for coping such as mindfulness and deep breathing  We practiced box breathing and imagery breathing which she can practice 5-10 times daily to be able to use in acute stressful situations when needed  The patient indicates understanding of these issues and agrees with the plan  Return in about 6 months (around 7/24/2023) for Recheck mood, annual visit  Depression Screening and Follow-up Plan: Continue regular follow-up with their mental health provider who is managing their mental health condition(s)  Emotional and Mental Well-being, Sleep, Connectedness Assessment and Intervention:    Sleep/stress assessment performed    Depression and anxiety screening performed and reviewed    PHQ-9 or GAD7 performed for initial evaluation or follow-up    Mindfulness program recommended/explained    Stress management plan created with patient    Stress management, sleep hygiene, or social connection patient handout reviewed with patient        Encounter provider Gloria Hoover DO    Provider located at [address] [id number]      Recent Visits  No visits were found meeting these conditions    Showing recent visits within past 7 days and meeting all other requirements  Today's Visits  Date Type Provider Dept   01/24/23 Telemedicine DO Saqib Call   Showing today's visits and meeting all other requirements  Future Appointments  No visits were found meeting these conditions  Showing future appointments within next 150 days and meeting all other requirements       The patient was identified by name and date of birth  Cecilia Wilson was informed that this is a telemedicine visit and that the visit is being conducted through the [facility] [employer]  She agrees to proceed     My office door was closed  No one else was in the room  She acknowledged consent and understanding of privacy and security of the video platform  The patient has agreed to participate and understands they can discontinue the visit at any time  Patient is aware this is a billable service  Subjective  Cecilia Wilson is a 33 y o  female who presents for f/u depression and anxiety symptoms  She reports that since starting sertraline, she has seen improvement in her mood  She still has daily stressors, however, she is coping with them well  She states that she has a good home support system as is in the local choir which brings her miles  She is tolerating zoloft well  When she has an acute stressor, she states that she steps away and takes a moment to collect her thoughts before continuing her activity  Past Medical History:   Diagnosis Date   • Anxiety    • Depression 2020   • GERD (gastroesophageal reflux disease)        Past Surgical History:   Procedure Laterality Date   • MYRINGOTOMY         Current Outpatient Medications   Medication Sig Dispense Refill   • sertraline (ZOLOFT) 25 mg tablet Take 1 tablet (25 mg total) by mouth daily 90 tablet 1   • levonorgestrel (LILETTA) 18 6 MCG/DAY intrauterine device by Intrauterine route       No current facility-administered medications for this visit  Allergies   Allergen Reactions   • Latex Rash       Review of Systems   Constitutional: Negative for chills, fever and unexpected weight change  HENT: Negative for congestion and sore throat  Eyes: Negative for pain and visual disturbance     Respiratory: Negative for chest tightness and shortness of breath  Cardiovascular: Negative for chest pain and palpitations  Gastrointestinal: Negative for abdominal pain, nausea and vomiting  Genitourinary: Negative for difficulty urinating and dysuria  Neurological: Negative for seizures, syncope, weakness, light-headedness and headaches  Psychiatric/Behavioral: Negative for confusion, dysphoric mood, hallucinations, self-injury, sleep disturbance and suicidal ideas  The patient is not nervous/anxious  Video Exam    There were no vitals filed for this visit  Physical Exam  Constitutional:       General: She is not in acute distress  Appearance: She is not ill-appearing  HENT:      Head: Normocephalic  Right Ear: External ear normal       Left Ear: External ear normal       Nose: No congestion  Mouth/Throat:      Mouth: Mucous membranes are moist    Eyes:      General: No scleral icterus  Extraocular Movements: Extraocular movements intact  Pupils: Pupils are equal, round, and reactive to light  Pulmonary:      Effort: Pulmonary effort is normal  No respiratory distress  Musculoskeletal:      Cervical back: Normal range of motion  No erythema  Skin:     Coloration: Skin is not jaundiced  Findings: No erythema or rash  Neurological:      Mental Status: She is alert and oriented to person, place, and time  Psychiatric:         Attention and Perception: Attention and perception normal          Mood and Affect: Mood normal          Behavior: Behavior normal  Behavior is cooperative            I spent 15 minutes directly with the patient during this visit

## 2023-03-23 ENCOUNTER — OFFICE VISIT (OUTPATIENT)
Dept: FAMILY MEDICINE CLINIC | Facility: OTHER | Age: 33
End: 2023-03-23

## 2023-03-23 VITALS
HEIGHT: 66 IN | BODY MASS INDEX: 24.62 KG/M2 | DIASTOLIC BLOOD PRESSURE: 70 MMHG | OXYGEN SATURATION: 97 % | HEART RATE: 61 BPM | TEMPERATURE: 98 F | WEIGHT: 153.2 LBS | RESPIRATION RATE: 18 BRPM | SYSTOLIC BLOOD PRESSURE: 104 MMHG

## 2023-03-23 DIAGNOSIS — Z91.040 LATEX ALLERGY: ICD-10-CM

## 2023-03-23 DIAGNOSIS — F32.9 REACTIVE DEPRESSION: Primary | ICD-10-CM

## 2023-03-23 RX ORDER — EPINEPHRINE 0.3 MG/.3ML
0.3 INJECTION SUBCUTANEOUS ONCE
Qty: 0.6 ML | Refills: 2 | Status: SHIPPED | OUTPATIENT
Start: 2023-03-23 | End: 2023-03-23

## 2023-03-23 NOTE — PROGRESS NOTES
Name: Dylan Ford      : 1990      MRN: [id number]  Encounter Provider: Reynold Cabral DO  Encounter Date: 3/23/2023   Encounter department: [address] [place] [address]      1  Reactive depression  -     sertraline (ZOLOFT) 50 mg tablet; Take 1 tablet (50 mg total) by mouth daily    2  Latex allergy  -     EPINEPHrine (EPIPEN) 0 3 mg/0 3 mL SOAJ; Inject 0 3 mL (0 3 mg total) into a muscle once for 1 dose          Return in about 4 months (around 2023) for Annual physical     The patient indicates understanding of these issues and agrees with the plan  Subjective      Pt is a 32 yo F w/ a PMH of depression, anxiety, and GERD who presents for follow-up of medication management for depression  After increasing Zoloft to 50 mg daily, pt reports R-sided leg muscle spasm for four days that have since resolved  Pt noticed some stomach upset as well, which has also resolved  Pt declining problems associated with decreased libido  Requesting 3-month refill of Zoloft  Pt requesting Epipen  When pt was exposed to latex one month ago, pt experienced SOB, hives, pruritis, cough  Felt like her throat was closing  Improved with Benadryl  Depression  This is a chronic problem  The current episode started more than 1 year ago  The problem has been gradually improving  Pertinent negatives include no abdominal pain, arthralgias, chest pain, chills, congestion, coughing, fatigue, fever, headaches, myalgias, nausea, neck pain, rash, sore throat, urinary symptoms or vomiting  The symptoms are aggravated by stress  Treatments tried: Zoloft, counseling, deep breathing, yoga  The treatment provided moderate relief  Review of Systems   Constitutional: Negative for activity change, chills, fatigue and fever  HENT: Negative for congestion, ear pain, sinus pain and sore throat  Eyes: Negative for pain and itching     Respiratory: Negative for cough and shortness of breath  Cardiovascular: Negative for chest pain and palpitations  Gastrointestinal: Negative for abdominal pain, constipation, diarrhea, nausea and vomiting  Endocrine: Negative for cold intolerance and heat intolerance  Genitourinary: Negative for dysuria and flank pain  Musculoskeletal: Negative for arthralgias, myalgias and neck pain  Skin: Negative for color change and rash  Neurological: Negative for dizziness, seizures, syncope and headaches  Hematological: Negative for adenopathy  Psychiatric/Behavioral: Positive for depression  Negative for behavioral problems, dysphoric mood and sleep disturbance  The patient is not nervous/anxious  Current Outpatient Medications on File Prior to Visit   Medication Sig   • levonorgestrel (LILETTA) 18 6 MCG/DAY intrauterine device by Intrauterine route       Objective     /70   Pulse 61   Temp 98 °F (36 7 °C)   Resp 18   Ht 5' 6" (1 676 m)   Wt 69 5 kg (153 lb 3 2 oz)   SpO2 97%   BMI 24 73 kg/m²     Physical Exam  Vitals and nursing note reviewed  Constitutional:       General: She is not in acute distress  Appearance: Normal appearance  She is well-developed  She is not ill-appearing  Comments: Body mass index is 24 73 kg/m²  HENT:      Head: Normocephalic and atraumatic  Right Ear: External ear normal       Left Ear: External ear normal       Nose: Nose normal       Mouth/Throat:      Mouth: Mucous membranes are moist       Pharynx: No oropharyngeal exudate  Eyes:      General: No scleral icterus  Conjunctiva/sclera: Conjunctivae normal       Pupils: Pupils are equal, round, and reactive to light  Neck:      Thyroid: No thyromegaly  Cardiovascular:      Rate and Rhythm: Normal rate and regular rhythm  Pulses: Normal pulses  Heart sounds: Normal heart sounds  No murmur heard  No friction rub  No gallop  Pulmonary:      Effort: Pulmonary effort is normal  No respiratory distress        Breath sounds: Normal breath sounds  No wheezing or rales  Abdominal:      General: Abdomen is flat  Bowel sounds are normal  There is no distension  Palpations: Abdomen is soft  Tenderness: There is no abdominal tenderness  There is no guarding  Musculoskeletal:         General: No tenderness  Normal range of motion  Cervical back: Normal range of motion and neck supple  Right lower leg: No edema  Left lower leg: No edema  Lymphadenopathy:      Cervical: No cervical adenopathy  Skin:     General: Skin is warm and dry  Coloration: Skin is not jaundiced  Findings: No rash  Neurological:      General: No focal deficit present  Mental Status: She is alert and oriented to person, place, and time  Cranial Nerves: No cranial nerve deficit     Psychiatric:         Mood and Affect: Mood normal          Behavior: Behavior normal         Labs reviewed: CMP, lipid panel  Imaging reviewed: N/A      Jonatan Nam DO

## 2023-07-03 DIAGNOSIS — F32.9 REACTIVE DEPRESSION: ICD-10-CM

## 2023-07-14 ENCOUNTER — TELEPHONE (OUTPATIENT)
Dept: FAMILY MEDICINE CLINIC | Facility: OTHER | Age: 33
End: 2023-07-14

## 2023-07-14 NOTE — TELEPHONE ENCOUNTER
Left message for patient to return office call     She did send us a message via [employer] to schedule her physical     Appointment Request From: Eyad Serra      With Provider: Cheryle Keen, DO [[address]      Preferred Date Range: 7/17/2023 - 7/20/2023      Preferred Times: Any Time      Reason for visit: Primary Care Adult Annual Physical      Comments:   Rescheduling for an appointment with Dr. Eric Cardona or one of her team. I was put with Dr. Adelaida Herrera and would prefer another provider.

## 2023-08-09 ENCOUNTER — RA CDI HCC (OUTPATIENT)
Dept: OTHER | Facility: HOSPITAL | Age: 33
End: 2023-08-09

## 2023-08-09 NOTE — PROGRESS NOTES
F32A     NOT on the BPA- please assess using MEAT for 2023 billing  [address] coding opportunities          Chart Reviewed number of suggestions sent to Provider: 1     Patients Insurance        Commercial Insurance: Jamarcus Sarkar

## 2023-08-21 ENCOUNTER — OFFICE VISIT (OUTPATIENT)
Dept: FAMILY MEDICINE CLINIC | Facility: OTHER | Age: 33
End: 2023-08-21
Payer: COMMERCIAL

## 2023-08-21 VITALS
HEART RATE: 88 BPM | OXYGEN SATURATION: 98 % | TEMPERATURE: 98.4 F | SYSTOLIC BLOOD PRESSURE: 104 MMHG | DIASTOLIC BLOOD PRESSURE: 72 MMHG | RESPIRATION RATE: 13 BRPM | HEIGHT: 66 IN | WEIGHT: 161 LBS | BODY MASS INDEX: 25.88 KG/M2

## 2023-08-21 DIAGNOSIS — R63.5 WEIGHT GAIN: ICD-10-CM

## 2023-08-21 DIAGNOSIS — Z13.6 SCREENING FOR CARDIOVASCULAR CONDITION: ICD-10-CM

## 2023-08-21 DIAGNOSIS — Z00.00 ANNUAL PHYSICAL EXAM: Primary | ICD-10-CM

## 2023-08-21 DIAGNOSIS — Z11.4 SCREENING FOR HIV (HUMAN IMMUNODEFICIENCY VIRUS): ICD-10-CM

## 2023-08-21 DIAGNOSIS — Z11.59 NEED FOR HEPATITIS C SCREENING TEST: ICD-10-CM

## 2023-08-21 DIAGNOSIS — K62.5 RECTAL BLEEDING: ICD-10-CM

## 2023-08-21 DIAGNOSIS — Z13.1 SCREENING FOR DIABETES MELLITUS: ICD-10-CM

## 2023-08-21 PROCEDURE — 99395 PREV VISIT EST AGE 18-39: CPT

## 2023-08-21 NOTE — PROGRESS NOTES
[id number]    NAME: Brayan Duty  AGE: 33 y.o. SEX: female  : 1990     DATE: 2023     Assessment and Plan:     Problem List Items Addressed This Visit    None  Visit Diagnoses     Annual physical exam    -  Primary    Screening for HIV (human immunodeficiency virus)        Relevant Orders    : HIV 1/2 AB/AG w Reflex [facility] for 2 yr old and above    Need for hepatitis C screening test        Relevant Orders    Hepatitis C antibody    Screening for diabetes mellitus        Relevant Orders    Comprehensive metabolic panel    Screening for cardiovascular condition        Relevant Orders    Lipid Panel with Direct LDL reflex    Weight gain        Relevant Orders    TSH, 3rd generation with Free T4 reflex    Rectal bleeding        Relevant Orders    Ambulatory Referral to Gastroenterology          Immunizations and preventive care screenings were discussed with patient today. Appropriate education was printed on patient's after visit summary. Counseling:  Alcohol/drug use: discussed moderation in alcohol intake, the recommendations for healthy alcohol use, and avoidance of illicit drug use. Dental Health: discussed importance of regular tooth brushing, flossing, and dental visits. Injury prevention: discussed safety/seat belts, safety helmets, smoke detectors, carbon dioxide detectors, and smoking near bedding or upholstery. Sexual health: discussed sexually transmitted diseases, partner selection, use of condoms, avoidance of unintended pregnancy, and contraceptive alternatives. Exercise: the importance of regular exercise/physical activity was discussed. Recommend exercise 3-5 times per week for at least 30 minutes. BMI Counseling: Body mass index is 25.99 kg/m².  The BMI is above normal. Nutrition recommendations include encouraging healthy choices of fruits and vegetables, moderation in carbohydrate intake, increasing intake of lean protein and reducing intake of saturated and trans fat. Exercise recommendations include moderate physical activity 150 minutes/week, vigorous physical activity 75 minutes/week and exercising 3-5 times per week. Rationale for BMI follow-up plan is due to patient being overweight or obese. Physical Activity Assessment and Intervention:    Physical Activity Prescription completed with patient      Emotional and Mental Well-being, Sleep, Connectedness Assessment and Intervention:    Sleep/stress assessment performed    Counseled regarding sleep hygiene and aspects of healthy sleep      Tobacco and Toxic Substance Assessment and Intervention:     Alcohol and drug use screening performed        Return in 8 months (on 4/21/2024) for Recheck mood. Chief Complaint:     Chief Complaint   Patient presents with   • Physical Exam      History of Present Illness:     Adult Annual Physical   Patient here for a comprehensive physical exam. The patient reports recent increase of zoloft, has gained some weight. Diet and Physical Activity  Diet/Nutrition: well balanced diet, consuming 3-5 servings of fruits/vegetables daily and counting macros. Exercise: moderate cardiovascular exercise, strength training exercises, 3-4 times a week on average and 30-60 minutes on average. Depression Screening  PHQ-2/9 Depression Screening         General Health  Sleep: gets more than 8 hours of sleep on average and intermittent snoring with congestion. Hearing: normal - bilateral. Does have tinnitus, started when started taking buspirone 4 years. Vision: goes for regular eye exams and previous LASIK surgery. Dental: regular dental visits, brushes teeth twice daily and flosses daily. /GYN Health  Last menstrual period: regular, monthly, minimal spotting  Contraceptive method: IUD placement.   History of STDs?: no.     Review of Systems:     Review of Systems   Constitutional: Negative for chills, fever and unexpected weight change (weight gain due to zoloft). HENT: Negative for congestion and rhinorrhea. Respiratory: Negative for cough. Cardiovascular: Negative for chest pain and palpitations. Gastrointestinal: Negative for constipation and diarrhea. Trace blood with BMs/wiping, not consistent   Genitourinary: Negative for dysuria, hematuria and menstrual problem. Musculoskeletal: Negative for back pain. Allergic/Immunologic: Negative for environmental allergies, food allergies and immunocompromised state. Neurological: Negative for headaches. Psychiatric/Behavioral: Negative for sleep disturbance. The patient is not nervous/anxious. Past Medical History:     Past Medical History:   Diagnosis Date   • Anxiety    • Depression 2020   • GERD (gastroesophageal reflux disease)       Past Surgical History:     Past Surgical History:   Procedure Laterality Date   • MYRINGOTOMY        Social History:     Social History     Socioeconomic History   • Marital status: Single     Spouse name: None   • Number of children: None   • Years of education: None   • Highest education level: None   Occupational History   • None   Tobacco Use   • Smoking status: Never   • Smokeless tobacco: Never   Vaping Use   • Vaping Use: Never used   Substance and Sexual Activity   • Alcohol use: Yes     Alcohol/week: 2.0 standard drinks of alcohol     Types: 2 Glasses of wine per week     Comment: socially   • Drug use: No   • Sexual activity: Yes     Partners: Male     Birth control/protection: I.U.D.    Other Topics Concern   • None   Social History Narrative   • None     Social Determinants of Health     Financial Resource Strain: Not on file   Food Insecurity: Not on file   Transportation Needs: Not on file   Physical Activity: Not on file   Stress: Not on file   Social Connections: Not on file   Intimate Partner Violence: Not At Risk (8/13/2019)    Humiliation, Afraid, Rape, and Kick questionnaire • Fear of Current or Ex-Partner: No    • Emotionally Abused: No    • Physically Abused: No    • Sexually Abused: No   Housing Stability: Not on file      Family History:     Family History   Problem Relation Age of Onset   • Depression Mother    • Miscarriages / Stillbirths Mother    • Osteoporosis Mother    • Hypertension Father    • Colon cancer Father         Dx in 2020. Reoccurrence in 2022   • Cancer Paternal Grandmother         Pancreatic Cancer, terminal   • Heart attack Maternal Grandfather         Late 50's early 63's. Terminal      Current Medications:     Current Outpatient Medications   Medication Sig Dispense Refill   • EPINEPHrine (EPIPEN) 0.3 mg/0.3 mL SOAJ Inject 0.3 mL (0.3 mg total) into a muscle once for 1 dose 0.6 mL 2   • levonorgestrel (LILETTA) 18.6 MCG/DAY intrauterine device by Intrauterine route     • sertraline (ZOLOFT) 50 mg tablet Take 1 tablet (50 mg total) by mouth daily 90 tablet 1     No current facility-administered medications for this visit. Allergies: Allergies   Allergen Reactions   • Latex Rash      Physical Exam:     /72   Pulse 88   Temp 98.4 °F (36.9 °C)   Resp 13   Ht 5' 6" (1.676 m)   Wt 73 kg (161 lb)   SpO2 98%   BMI 25.99 kg/m²     Physical Exam  Vitals reviewed. Constitutional:       General: She is not in acute distress. Appearance: Normal appearance. She is well-developed. She is not ill-appearing, toxic-appearing or diaphoretic. HENT:      Head: Normocephalic and atraumatic. Right Ear: Tympanic membrane, ear canal and external ear normal. There is no impacted cerumen. Left Ear: Tympanic membrane, ear canal and external ear normal. There is no impacted cerumen. Nose: Nose normal. No congestion or rhinorrhea. Mouth/Throat:      Mouth: Mucous membranes are moist.      Pharynx: Oropharynx is clear. No oropharyngeal exudate or posterior oropharyngeal erythema. Eyes:      General: No scleral icterus.         Right eye: No discharge. Left eye: No discharge. Extraocular Movements: Extraocular movements intact. Conjunctiva/sclera: Conjunctivae normal.      Pupils: Pupils are equal, round, and reactive to light. Neck:      Comments: Thyroid nontender, without nodularity or significant unilateral swelling  Cardiovascular:      Rate and Rhythm: Normal rate and regular rhythm. Pulmonary:      Effort: Pulmonary effort is normal. No respiratory distress. Breath sounds: Normal breath sounds. No stridor. No wheezing, rhonchi or rales. Abdominal:      General: Abdomen is flat. Bowel sounds are normal. There is no distension. Palpations: Abdomen is soft. There is no mass. Tenderness: There is no abdominal tenderness. There is no guarding. Musculoskeletal:         General: No swelling or tenderness. Normal range of motion. Cervical back: Normal range of motion and neck supple. No rigidity or tenderness. Right lower leg: No edema. Left lower leg: No edema. Skin:     General: Skin is warm and dry. Capillary Refill: Capillary refill takes less than 2 seconds. Neurological:      General: No focal deficit present. Mental Status: She is alert and oriented to person, place, and time. Mental status is at baseline. Motor: No weakness.       Gait: Gait normal.   Psychiatric:         Mood and Affect: Mood normal.         Behavior: Behavior normal.          Valencia Mckeon, DO   [facility] [address]

## 2023-08-30 ENCOUNTER — CONSULT (OUTPATIENT)
Dept: GASTROENTEROLOGY | Facility: AMBULARY SURGERY CENTER | Age: 33
End: 2023-08-30
Payer: COMMERCIAL

## 2023-08-30 VITALS
OXYGEN SATURATION: 98 % | BODY MASS INDEX: 26.07 KG/M2 | HEIGHT: 66 IN | SYSTOLIC BLOOD PRESSURE: 110 MMHG | DIASTOLIC BLOOD PRESSURE: 78 MMHG | WEIGHT: 162.2 LBS | HEART RATE: 63 BPM

## 2023-08-30 DIAGNOSIS — K62.5 RECTAL BLEEDING: Primary | ICD-10-CM

## 2023-08-30 DIAGNOSIS — K21.00 GASTROESOPHAGEAL REFLUX DISEASE WITH ESOPHAGITIS, UNSPECIFIED WHETHER HEMORRHAGE: ICD-10-CM

## 2023-08-30 PROCEDURE — 99203 OFFICE O/P NEW LOW 30 MIN: CPT | Performed by: PHYSICIAN ASSISTANT

## 2023-08-30 RX ORDER — PANTOPRAZOLE SODIUM 40 MG/1
40 TABLET, DELAYED RELEASE ORAL DAILY
Qty: 30 TABLET | Refills: 3 | Status: SHIPPED | OUTPATIENT
Start: 2023-08-30

## 2023-08-30 NOTE — PROGRESS NOTES
[facility] - Outpatient Consultation  Alton Coelho 33 y.o. female MRN: [id number]  Encounter: [id number]          ASSESSMENT AND PLAN:      1. Rectal bleeding  - likely outlet bleeding with hemorrhoids  - will plan diagnostic colonoscopy to rule out any underlying neoplasm    2. Gastroesophageal reflux disease with esophagitis, unspecified whether hemorrhage  - stop ranitidine due to recall  - omeprazole 40mg daily ineffective  - some occasional dysphagia  - plan start pantoprazole 40mg daily and perform EGD    ______________________________________________________________________    Chief Complaint: Rectal bleeding    HPI: This is a 44-year-old white female who presents for initial consult with regard to rectal bleeding. Patient's past medical history for gastroesophageal reflux disease and anxiety depression. He was recently seen for annual physical with her primary care on August 21, 2023 and had noted trace bleeding on toilet paper at times. This is inconsistent and not with every bowel movement. Years of issues - 6-8 months  Number of bowel movements weekly - 7  Straining - Yes  Sense of incomplete bowel movements - Yes  History of hemorrhoids - Yes  History of rectal bleeding - Yes  Current medications - fluids  OTC meds in past - none  Rx meds in past - none  Last colonoscopy - never had one      Years of GERD - 10 years  Current medications - none  Past medications - Zantac, Omeprazole  Pain or difficulty swallowing - solid foods at times at top of esopahgus  Weight loss - No  Last esophageal imaging - none recently  Last EGD - 2/2018  Needs to avoid spicy foods, red letty    No pacemaker or defibrillator implanted. No chronic kidney disease or solitary kidney. Not on any supplemental oxygen at night. Not on any antiplatelet or anticoagulants.         Endoscopy History:  EGD -2/2018 normal per patient  Colonoscopy -none previously    REVIEW OF SYSTEMS:    CONSTITUTIONAL: Denies any fever, chills, rigors, and weight loss. HEENT: Denies hearing loss or visual disturbances. CARDIOVASCULAR: No chest pain or palpitations. RESPIRATORY: Denies any cough, hemoptysis, shortness of breath or dyspnea on exertion. GASTROINTESTINAL: As noted in the History of Present Illness. GENITOURINARY: No problems with urination. Denies any hematuria or dysuria. NEUROLOGIC: No dizziness or vertigo, denies headaches. MUSCULOSKELETAL: Denies any muscle or joint pain. SKIN: Denies skin rashes or itching. ENDOCRINE: Denies excessive thirst. Denies intolerance to heat or cold. PSYCHOSOCIAL: Denies depression or anxiety. Denies any recent memory loss. Historical Information   Past Medical History:   Diagnosis Date   • Anxiety    • Depression 2020   • GERD (gastroesophageal reflux disease)      Past Surgical History:   Procedure Laterality Date   • MYRINGOTOMY       Social History   Social History     Substance and Sexual Activity   Alcohol Use Yes   • Alcohol/week: 2.0 standard drinks of alcohol   • Types: 2 Glasses of wine per week    Comment: socially     Social History     Substance and Sexual Activity   Drug Use No     Social History     Tobacco Use   Smoking Status Never   Smokeless Tobacco Never     Family History   Problem Relation Age of Onset   • Depression Mother    • Miscarriages / Stillbirths Mother    • Osteoporosis Mother    • Hypertension Father    • Colon cancer Father         Dx in 2020. Reoccurrence in 2022   • Cancer Paternal Grandmother         Pancreatic Cancer, terminal   • Heart attack Maternal Grandfather         Late 50's early 63's.  Terminal       Meds/Allergies       Current Outpatient Medications:   •  EPINEPHrine (EPIPEN) 0.3 mg/0.3 mL SOAJ  •  levonorgestrel (LILETTA) 18.6 MCG/DAY intrauterine device  •  pantoprazole (PROTONIX) 40 mg tablet  •  sertraline (ZOLOFT) 50 mg tablet    Allergies   Allergen Reactions   • Latex Rash           Objective     Blood pressure 110/78, pulse 63, height 5' 6" (1.676 m), weight 73.6 kg (162 lb 3.2 oz), SpO2 98 %. Body mass index is 26.18 kg/m². PHYSICAL EXAM:      General Appearance:   Alert, cooperative, no distress, appears stated age   HEENT:   Normocephalic, atraumatic, anicteric. Neck:  Supple, symmetrical   Lungs:   Clear to auscultation bilaterally; no rales, rhonchi or wheezing; respirations unlabored    Heart[de-identified]   Regular rate and rhythm; no murmur, rub, or gallop. Abdomen:   Soft, non-tender, non-distended; normal bowel sounds; no masses, no organomegaly    Genitalia:   Deferred    Rectal:   Deferred    Extremities:  No cyanosis, clubbing or edema    Pulses:  Not assessed   Skin:  No jaundice, rashes, or lesions    Lymph nodes:  Not assessed       Lab Results:   No visits with results within 1 Day(s) from this visit. Latest known visit with results is:   Annual Exam on 12/23/2022   Component Date Value   • Case Report 12/23/2022                      Value:Gynecologic Cytology Report                       Case: Mateo Torres Provider:  Zachery Gomes PA-C  Collected:           12/23/2022 1307              Ordering Location:     [facility] [place] ([place])    Received:            12/23/2022 1307                                     [facility]                                                             First Screen:          Cherylin Core, CT                                                    Specimen:    LIQUID-BASED PAP, SCREENING, Cervix                                                       • Primary Interpretation 12/23/2022 Negative for intraepithelial lesion or malignancy    • Specimen Adequacy 12/23/2022 Satisfactory for evaluation. Endocervical/transformation zone component present. • Additional Information 12/23/2022                      Value: This result contains rich text formatting which cannot be displayed here.    • HPV Other HR 12/23/2022 Negative • HPV16 12/23/2022 Negative    • HPV18 12/23/2022 Negative          Radiology Results:   No results found. Pablo Moreau PA-C

## 2023-08-30 NOTE — LETTER
August 30, 2023     [address] [place] [address]    Patient: Brayden Castellon   YOB: 1990   Date of Visit: 8/30/2023       Dear Dr. Abbey Sylvester:    Thank you for referring Brayden Castellon to me for evaluation. Below are my notes for this consultation. If you have questions, please do not hesitate to call me. I look forward to following your patient along with you. Sincerely,        Abiel Guillen PA-C        CC: No Recipients    [place], [place]  8/30/2023  3:04 PM  [facility] - Outpatient Consultation  Brayden Castellon 33 y.o. female MRN: [id number]  Encounter: [id number]          ASSESSMENT AND PLAN:      1. Rectal bleeding  - likely outlet bleeding with hemorrhoids  - will plan diagnostic colonoscopy to rule out any underlying neoplasm    2. Gastroesophageal reflux disease with esophagitis, unspecified whether hemorrhage  - stop ranitidine due to recall  - omeprazole 40mg daily ineffective  - some occasional dysphagia  - plan start pantoprazole 40mg daily and perform EGD    ______________________________________________________________________    Chief Complaint: Rectal bleeding    HPI: This is a 36-year-old white female who presents for initial consult with regard to rectal bleeding. Patient's past medical history for gastroesophageal reflux disease and anxiety depression. He was recently seen for annual physical with her primary care on August 21, 2023 and had noted trace bleeding on toilet paper at times. This is inconsistent and not with every bowel movement.     Years of issues - 6-8 months  Number of bowel movements weekly - 7  Straining - Yes  Sense of incomplete bowel movements - Yes  History of hemorrhoids - Yes  History of rectal bleeding - Yes  Current medications - fluids  OTC meds in past - none  Rx meds in past - none  Last colonoscopy - never had one      Years of GERD - 10 years  Current medications - none  Past medications - Zantac, Omeprazole  Pain or difficulty swallowing - solid foods at times at top of esopahgus  Weight loss - No  Last esophageal imaging - none recently  Last EGD - 2/2018  Needs to avoid spicy foods, red letty    No pacemaker or defibrillator implanted. No chronic kidney disease or solitary kidney. Not on any supplemental oxygen at night. Not on any antiplatelet or anticoagulants. Endoscopy History:  EGD -2/2018 normal per patient  Colonoscopy -none previously    REVIEW OF SYSTEMS:    CONSTITUTIONAL: Denies any fever, chills, rigors, and weight loss. HEENT: Denies hearing loss or visual disturbances. CARDIOVASCULAR: No chest pain or palpitations. RESPIRATORY: Denies any cough, hemoptysis, shortness of breath or dyspnea on exertion. GASTROINTESTINAL: As noted in the History of Present Illness. GENITOURINARY: No problems with urination. Denies any hematuria or dysuria. NEUROLOGIC: No dizziness or vertigo, denies headaches. MUSCULOSKELETAL: Denies any muscle or joint pain. SKIN: Denies skin rashes or itching. ENDOCRINE: Denies excessive thirst. Denies intolerance to heat or cold. PSYCHOSOCIAL: Denies depression or anxiety. Denies any recent memory loss.        Historical Information   Past Medical History:   Diagnosis Date   • Anxiety    • Depression 2020   • GERD (gastroesophageal reflux disease)      Past Surgical History:   Procedure Laterality Date   • MYRINGOTOMY       Social History   Social History     Substance and Sexual Activity   Alcohol Use Yes   • Alcohol/week: 2.0 standard drinks of alcohol   • Types: 2 Glasses of wine per week    Comment: socially     Social History     Substance and Sexual Activity   Drug Use No     Social History     Tobacco Use   Smoking Status Never   Smokeless Tobacco Never     Family History   Problem Relation Age of Onset   • Depression Mother    • Miscarriages / Stillbirths Mother    • Osteoporosis Mother    • Hypertension Father    • Colon cancer Father         Dx in 2020. Reoccurrence in 2022   • Cancer Paternal Grandmother         Pancreatic Cancer, terminal   • Heart attack Maternal Grandfather         Late 50's early 63's. Terminal       Meds/Allergies       Current Outpatient Medications:   •  EPINEPHrine (EPIPEN) 0.3 mg/0.3 mL SOAJ  •  levonorgestrel (LILETTA) 18.6 MCG/DAY intrauterine device  •  sertraline (ZOLOFT) 50 mg tablet    Allergies   Allergen Reactions   • Latex Rash           Objective     Blood pressure 110/78, pulse 63, height 5' 6" (1.676 m), weight 73.6 kg (162 lb 3.2 oz), SpO2 98 %. Body mass index is 26.18 kg/m². PHYSICAL EXAM:      General Appearance:   Alert, cooperative, no distress, appears stated age   HEENT:   Normocephalic, atraumatic, anicteric. Neck:  Supple, symmetrical   Lungs:   Clear to auscultation bilaterally; no rales, rhonchi or wheezing; respirations unlabored    Heart[de-identified]   Regular rate and rhythm; no murmur, rub, or gallop. Abdomen:   Soft, non-tender, non-distended; normal bowel sounds; no masses, no organomegaly    Genitalia:   Deferred    Rectal:   Deferred    Extremities:  No cyanosis, clubbing or edema    Pulses:  Not assessed   Skin:  No jaundice, rashes, or lesions    Lymph nodes:  Not assessed       Lab Results:   No visits with results within 1 Day(s) from this visit.    Latest known visit with results is:   Annual Exam on 12/23/2022   Component Date Value   • Case Report 12/23/2022                      Value:Gynecologic Cytology Report                       Case: Efra Fothergill Provider:  Jez Collazo PA-C  Collected:           12/23/2022 1307              Ordering Location:     [facility] [place] [facility]    Received:            12/23/2022 [address]:          [address] Specimen:    LIQUID-BASED PAP, SCREENING, Cervix                                                       • Primary Interpretation 12/23/2022 Negative for intraepithelial lesion or malignancy    • Specimen Adequacy 12/23/2022 Satisfactory for evaluation. Endocervical/transformation zone component present. • Additional Information 12/23/2022                      Value: This result contains rich text formatting which cannot be displayed here. • HPV Other HR 12/23/2022 Negative    • HPV16 12/23/2022 Negative    • HPV18 12/23/2022 Negative          Radiology Results:   No results found. Zuly Duke  8/30/2023  2:48 PM  Sign when Signing Visit  [facility] - Outpatient Consultation  Andria Valenzuela 33 y.o. female MRN: [id number]  Encounter: [id number]          ASSESSMENT AND PLAN:      1. Rectal bleeding  - likely outlet bleeding with hemorrhoids  - will plan diagnostic colonoscopy to rule out any underlying neoplasm    2. Gastroesophageal reflux disease with esophagitis, unspecified whether hemorrhage  -Stable on diet    ______________________________________________________________________    Chief Complaint: Rectal bleeding    HPI: This is a 81-year-old white female who presents for initial consult with regard to rectal bleeding. Patient's past medical history for gastroesophageal reflux disease and anxiety depression. He was recently seen for annual physical with her primary care on August 21, 2023 and had noted trace bleeding on toilet paper at times. This is inconsistent and not with every bowel movement.     Years of GERD -   Current medications - none  Past medications - Zantac, Omeprazole  Pain or difficulty swallowing - No  Weight loss - No  Last esophageal imaging - none recently  Last EGD - 2/2018      Endoscopy History:  EGD -none previously  Colonoscopy -none previously    REVIEW OF SYSTEMS:    CONSTITUTIONAL: Denies any fever, chills, rigors, and weight loss. HEENT: Denies hearing loss or visual disturbances. CARDIOVASCULAR: No chest pain or palpitations. RESPIRATORY: Denies any cough, hemoptysis, shortness of breath or dyspnea on exertion. GASTROINTESTINAL: As noted in the History of Present Illness. GENITOURINARY: No problems with urination. Denies any hematuria or dysuria. NEUROLOGIC: No dizziness or vertigo, denies headaches. MUSCULOSKELETAL: Denies any muscle or joint pain. SKIN: Denies skin rashes or itching. ENDOCRINE: Denies excessive thirst. Denies intolerance to heat or cold. PSYCHOSOCIAL: Denies depression or anxiety. Denies any recent memory loss. Historical Information  Past Medical History:   Diagnosis Date   • Anxiety    • Depression 2020   • GERD (gastroesophageal reflux disease)      Past Surgical History:   Procedure Laterality Date   • MYRINGOTOMY       Social History  Social History     Substance and Sexual Activity   Alcohol Use Yes   • Alcohol/week: 2.0 standard drinks of alcohol   • Types: 2 Glasses of wine per week    Comment: socially     Social History     Substance and Sexual Activity   Drug Use No     Social History     Tobacco Use   Smoking Status Never   Smokeless Tobacco Never     Family History   Problem Relation Age of Onset   • Depression Mother    • Miscarriages / Stillbirths Mother    • Osteoporosis Mother    • Hypertension Father    • Colon cancer Father         Dx in 2020. Reoccurrence in 2022   • Cancer Paternal Grandmother         Pancreatic Cancer, terminal   • Heart attack Maternal Grandfather         Late 50's early 63's.  Terminal       Meds/Allergies      Current Outpatient Medications:   •  EPINEPHrine (EPIPEN) 0.3 mg/0.3 mL SOAJ  •  levonorgestrel (LILETTA) 18.6 MCG/DAY intrauterine device  •  sertraline (ZOLOFT) 50 mg tablet    Allergies   Allergen Reactions   • Latex Rash           Objective    Blood pressure 110/78, pulse 63, height 5' 6" (1.676 m), weight 73.6 kg (162 lb 3.2 oz), SpO2 98 %. Body mass index is 26.18 kg/m². PHYSICAL EXAM:      General Appearance:   Alert, cooperative, no distress, appears stated age   HEENT:   Normocephalic, atraumatic, anicteric. Neck:  Supple, symmetrical   Lungs:   Clear to auscultation bilaterally; no rales, rhonchi or wheezing; respirations unlabored    Heart[de-identified]   Regular rate and rhythm; no murmur, rub, or gallop. Abdomen:   Soft, non-tender, non-distended; normal bowel sounds; no masses, no organomegaly    Genitalia:   Deferred    Rectal:   Deferred    Extremities:  No cyanosis, clubbing or edema    Pulses:  Not assessed   Skin:  No jaundice, rashes, or lesions    Lymph nodes:  Not assessed       Lab Results:   No visits with results within 1 Day(s) from this visit. Latest known visit with results is:   Annual Exam on 12/23/2022   Component Date Value   • Case Report 12/23/2022                      Value:Gynecologic Cytology Report                       Case: Claribel Martinez Provider:  Olivia Hensley PA-C  Collected:           12/23/2022 1307              Ordering Location:     [facility] [place] ([place])    Received:            12/23/2022 1307                                     [facility]                                                             First Screen:          Pati Amadorena, CT                                                    Specimen:    LIQUID-BASED PAP, SCREENING, Cervix                                                       • Primary Interpretation 12/23/2022 Negative for intraepithelial lesion or malignancy    • Specimen Adequacy 12/23/2022 Satisfactory for evaluation. Endocervical/transformation zone component present. • Additional Information 12/23/2022                      Value: This result contains rich text formatting which cannot be displayed here.    • HPV Other HR 12/23/2022 Negative    • HPV16 12/23/2022 Negative    • HPV18 12/23/2022 Negative Radiology Results:   No results found. Ricci Murillo PA-C

## 2023-08-30 NOTE — PATIENT INSTRUCTIONS
GERD (Gastroesophageal Reflux Disease)   AMBULATORY CARE:   Gastroesophageal reflux disease (GERD)  is reflux that happens more than 2 times a week for a few weeks. Reflux means acid and food in your stomach back up into your esophagus. GERD can cause other health problems over time if it is not treated. Common causes of GERD:  GERD often happens because the lower muscle (sphincter) of the esophagus does not close properly. The sphincter normally opens to let food into the stomach. It then closes to keep food and stomach acid in the stomach. If the sphincter does not close properly, stomach acid and food back up (reflux) into the esophagus. The following may increase your risk for GERD:  Certain foods such as spicy foods, chocolate, foods that contain caffeine, peppermint, and fried foods    Hiatal hernia    Certain medicines such as calcium channel blockers (used to treat high blood pressure), allergy medicines, sedatives, or antidepressants    Pregnancy, obesity, or scleroderma    Lying down after a meal    Drinking alcohol or smoking cigarettes    Signs and symptoms:   Heartburn (burning pain in your chest)    Pain after meals that spreads to your neck, jaw, or shoulder    Pain that gets better when you change positions    Bitter or acid taste in your mouth    A dry cough    Trouble swallowing or pain with swallowing    Hoarseness or a sore throat    Burping or hiccups    Feeling full soon after you start eating    Call your local emergency number (911 in the [address]) if:   You have severe chest pain and sudden trouble breathing. Seek care immediately if:   You have trouble breathing after you vomit. You have trouble swallowing, or pain with swallowing. Your bowel movements are black, bloody, or tarry-looking. Your vomit looks like coffee grounds or has blood in it. Call your doctor or gastroenterologist if:   You feel full and cannot burp or vomit.     You vomit large amounts, or you vomit often.    You are losing weight without trying. Your symptoms get worse or do not improve with treatment. You have questions or concerns about your condition or care. Treatment for GERD:   Medicines  are used to decrease stomach acid. Medicine may also be used to help your lower esophageal sphincter and stomach contract (tighten) more. Surgery  is done to wrap the upper part of the stomach around the esophageal sphincter. This will strengthen the sphincter and prevent reflux. Manage GERD:       Do not have foods or drinks that may increase heartburn. These include chocolate, peppermint, fried or fatty foods, drinks that contain caffeine, or carbonated drinks (soda). Other foods include spicy foods, onions, tomatoes, and tomato-based foods. Do not have foods or drinks that can irritate your esophagus, such as citrus fruits, juices, and alcohol. Do not eat large meals. When you eat a lot of food at one time, your stomach needs more acid to digest it. Eat 6 small meals each day instead of 3 large meals, and eat slowly. Do not eat meals 2 to 3 hours before bedtime. Elevate the head of your bed. Place 6-inch blocks under the head of your bed frame. You may also use more than one pillow under your head and shoulders while you sleep. Maintain a healthy weight. If you are overweight, weight loss may help relieve symptoms of GERD. Do not smoke. Smoking weakens the lower esophageal sphincter and increases the risk of GERD. Ask your healthcare provider for information if you currently smoke and need help to quit. E-cigarettes or smokeless tobacco still contain nicotine. Talk to your healthcare provider before you use these products. Do not put pressure on your abdomen. Pressure pushes acid up into your esophagus. Do not wear clothing that is tight around your waist. Do not bend over. Bend at the knees if you need to pick something up.   Follow up with your doctor or gastroenterologist as directed:  Write down your questions so you remember to ask them during your visits. © Copyright Lazaro Talamantes 2022 Information is for End User's use only and may not be sold, redistributed or otherwise used for commercial purposes. The above information is an  only. It is not intended as medical advice for individual conditions or treatments. Talk to your doctor, nurse or pharmacist before following any medical regimen to see if it is safe and effective for you.     Scheduled date of EGD/colonoscopy (as of today): 9/28/2023  Physician performing EGD/colonoscopy:   Location of EGD/colonoscopy: [facility]  Desired bowel prep reviewed with patient: Miralax/Dulcolax  Instructions reviewed with patient by: Dav Hauser  Clearances:  None

## 2023-09-28 ENCOUNTER — ANESTHESIA (OUTPATIENT)
Dept: GASTROENTEROLOGY | Facility: AMBULARY SURGERY CENTER | Age: 33
End: 2023-09-28

## 2023-09-28 ENCOUNTER — HOSPITAL ENCOUNTER (OUTPATIENT)
Dept: GASTROENTEROLOGY | Facility: AMBULARY SURGERY CENTER | Age: 33
Setting detail: OUTPATIENT SURGERY
Discharge: HOME | End: 2023-09-28
Attending: INTERNAL MEDICINE
Payer: COMMERCIAL

## 2023-09-28 ENCOUNTER — ANESTHESIA EVENT (OUTPATIENT)
Dept: GASTROENTEROLOGY | Facility: AMBULARY SURGERY CENTER | Age: 33
End: 2023-09-28

## 2023-09-28 VITALS
TEMPERATURE: 97.8 F | SYSTOLIC BLOOD PRESSURE: 115 MMHG | HEART RATE: 71 BPM | RESPIRATION RATE: 18 BRPM | DIASTOLIC BLOOD PRESSURE: 56 MMHG | OXYGEN SATURATION: 100 %

## 2023-09-28 DIAGNOSIS — K21.00 GASTROESOPHAGEAL REFLUX DISEASE WITH ESOPHAGITIS, UNSPECIFIED WHETHER HEMORRHAGE: ICD-10-CM

## 2023-09-28 DIAGNOSIS — K62.5 RECTAL BLEEDING: ICD-10-CM

## 2023-09-28 LAB
EXT PREGNANCY TEST URINE: NEGATIVE
EXT. CONTROL: NORMAL

## 2023-09-28 PROCEDURE — 81025 URINE PREGNANCY TEST: CPT | Performed by: ANESTHESIOLOGY

## 2023-09-28 PROCEDURE — 88313 SPECIAL STAINS GROUP 2: CPT | Performed by: PATHOLOGY

## 2023-09-28 PROCEDURE — 88342 IMHCHEM/IMCYTCHM 1ST ANTB: CPT | Performed by: PATHOLOGY

## 2023-09-28 PROCEDURE — 88305 TISSUE EXAM BY PATHOLOGIST: CPT | Performed by: PATHOLOGY

## 2023-09-28 RX ORDER — PROPOFOL 10 MG/ML
INJECTION, EMULSION INTRAVENOUS CONTINUOUS PRN
Status: DISCONTINUED | OUTPATIENT
Start: 2023-09-28 | End: 2023-09-28

## 2023-09-28 RX ORDER — HYDROCORTISONE ACETATE 25 MG/1
25 SUPPOSITORY RECTAL 2 TIMES DAILY
Qty: 12 SUPPOSITORY | Refills: 0 | Status: SHIPPED | OUTPATIENT
Start: 2023-09-28

## 2023-09-28 RX ORDER — PROPOFOL 10 MG/ML
INJECTION, EMULSION INTRAVENOUS AS NEEDED
Status: DISCONTINUED | OUTPATIENT
Start: 2023-09-28 | End: 2023-09-28

## 2023-09-28 RX ORDER — LIDOCAINE HYDROCHLORIDE 20 MG/ML
INJECTION, SOLUTION EPIDURAL; INFILTRATION; INTRACAUDAL; PERINEURAL AS NEEDED
Status: DISCONTINUED | OUTPATIENT
Start: 2023-09-28 | End: 2023-09-28

## 2023-09-28 RX ORDER — SODIUM CHLORIDE, SODIUM LACTATE, POTASSIUM CHLORIDE, CALCIUM CHLORIDE 600; 310; 30; 20 MG/100ML; MG/100ML; MG/100ML; MG/100ML
125 INJECTION, SOLUTION INTRAVENOUS CONTINUOUS
Status: DISCONTINUED | OUTPATIENT
Start: 2023-09-28 | End: 2023-10-02 | Stop reason: HOSPADM

## 2023-09-28 RX ADMIN — SODIUM CHLORIDE, SODIUM LACTATE, POTASSIUM CHLORIDE, AND CALCIUM CHLORIDE: .6; .31; .03; .02 INJECTION, SOLUTION INTRAVENOUS at 11:43

## 2023-09-28 RX ADMIN — LIDOCAINE HYDROCHLORIDE 100 MG: 20 INJECTION, SOLUTION EPIDURAL; INFILTRATION; INTRACAUDAL; PERINEURAL at 11:53

## 2023-09-28 RX ADMIN — PROPOFOL 120 MCG/KG/MIN: 10 INJECTION, EMULSION INTRAVENOUS at 11:53

## 2023-09-28 RX ADMIN — PROPOFOL 150 MG: 10 INJECTION, EMULSION INTRAVENOUS at 11:53

## 2023-09-28 NOTE — H&P
History and Physical -  [facility]  Nadeen Hernandez 33 y.o. female MRN: [id number]    HPI: Nadeen Hernandez is a 33y.o. year old female who presents with gerd and BRBPR      Review of Systems    Historical Information   Past Medical History:   Diagnosis Date   • Anxiety    • Depression 2020   • GERD (gastroesophageal reflux disease)      Past Surgical History:   Procedure Laterality Date   • MYRINGOTOMY       Social History   Social History     Substance and Sexual Activity   Alcohol Use Yes   • Alcohol/week: 2.0 standard drinks of alcohol   • Types: 2 Glasses of wine per week    Comment: socially     Social History     Substance and Sexual Activity   Drug Use No     Social History     Tobacco Use   Smoking Status Never   Smokeless Tobacco Never     Family History   Problem Relation Age of Onset   • Depression Mother    • Miscarriages / Stillbirths Mother    • Osteoporosis Mother    • Hypertension Father    • Colon cancer Father         Dx in 2020. Reoccurrence in 2022   • Cancer Paternal Grandmother         Pancreatic Cancer, terminal   • Heart attack Maternal Grandfather         Late 50's early 63's.  Terminal       Meds/Allergies     (Not in a hospital admission)      Allergies   Allergen Reactions   • Latex Rash       Objective     /60   Pulse 75   Temp 97.8 °F (36.6 °C) (Temporal)   Resp 16   SpO2 98%       PHYSICAL EXAM    Gen: NAD  CV: RRR  CHEST: Clear  ABD: soft, NT/ND  EXT: no edema  Neuro: AAO      ASSESSMENT/PLAN:  This is a 33y.o. year old female here for gerd and BRBPR      PLAN:   Procedure: egd

## 2023-09-28 NOTE — ANESTHESIA PREPROCEDURE EVALUATION
Procedure:  EGD  COLONOSCOPY    Relevant Problems   GI/HEPATIC   (+) Gastroesophageal reflux disease with esophagitis      NEURO/PSYCH   (+) Anxiety   (+) Depression        Physical Exam    Airway    Mallampati score: II  TM Distance: >3 FB  Neck ROM: full     Dental   No notable dental hx     Cardiovascular  Cardiovascular exam normal    Pulmonary  Pulmonary exam normal     Other Findings        Anesthesia Plan  ASA Score- 2     Anesthesia Type- IV sedation with anesthesia with ASA Monitors. Additional Monitors:   Airway Plan:           Plan Factors-Exercise tolerance (METS): >4 METS. Chart reviewed. Existing labs reviewed. Patient summary reviewed. Patient is not a current smoker. Induction-     Postoperative Plan-     Informed Consent- Anesthetic plan and risks discussed with patient. I personally reviewed this patient with the CRNA. Discussed and agreed on the Anesthesia Plan with the CRNA. Juan Manuel Whitmore

## 2023-09-29 PROCEDURE — 88313 SPECIAL STAINS GROUP 2: CPT | Performed by: PATHOLOGY

## 2023-09-29 PROCEDURE — 88305 TISSUE EXAM BY PATHOLOGIST: CPT | Performed by: PATHOLOGY

## 2023-09-29 PROCEDURE — 88342 IMHCHEM/IMCYTCHM 1ST ANTB: CPT | Performed by: PATHOLOGY

## 2023-10-11 ENCOUNTER — OFFICE VISIT (OUTPATIENT)
Dept: GASTROENTEROLOGY | Facility: AMBULARY SURGERY CENTER | Age: 33
End: 2023-10-11
Payer: COMMERCIAL

## 2023-10-11 VITALS
HEART RATE: 61 BPM | BODY MASS INDEX: 25.88 KG/M2 | OXYGEN SATURATION: 100 % | DIASTOLIC BLOOD PRESSURE: 60 MMHG | HEIGHT: 66 IN | WEIGHT: 161 LBS | SYSTOLIC BLOOD PRESSURE: 114 MMHG

## 2023-10-11 DIAGNOSIS — K27.9 PEPTIC ULCER: ICD-10-CM

## 2023-10-11 DIAGNOSIS — K21.00 GASTROESOPHAGEAL REFLUX DISEASE WITH ESOPHAGITIS, UNSPECIFIED WHETHER HEMORRHAGE: Primary | ICD-10-CM

## 2023-10-11 PROCEDURE — 99213 OFFICE O/P EST LOW 20 MIN: CPT | Performed by: PHYSICIAN ASSISTANT

## 2023-10-11 RX ORDER — PANTOPRAZOLE SODIUM 40 MG/1
TABLET, DELAYED RELEASE ORAL
Qty: 150 TABLET | Refills: 0 | Status: SHIPPED | OUTPATIENT
Start: 2023-10-11 | End: 2024-01-09

## 2023-10-11 NOTE — PROGRESS NOTES
Follow-up Note -  [facility]  Roberto Matthews 1990 33 y.o. female         ASSESSMENT & PLAN:    Peptic ulcer  Patient noted with antral ulcer on recent EGD, still experiencing burning epigastric pain at this time, may benefit from higher dose of PPI, she also reported alcohol and NSAID use relatively recently although has since cut back. 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 pylori infection.    -We will plan for EGD in 1 year, I explained biopsy findings to patient at this time and she expressed understanding    -For better treatment of her peptic ulcer we will increase her PPI therapy to twice daily and plan to remain at this dose for the next 2 months, then decrease to once daily therapy if she is improved at that time, I encouraged her to let us know if her symptoms are persisting or worsening at that point    -Advised patient about avoidance of NSAIDs, alcohol and other gastric irritants      Reason: Follow-up; gastric ulcer, goblet cell metaplasia    HPI: 67-year-old female with history of anxiety and depression who presents for follow-up, she was seen at the end of August for evaluation of rectal bleeding which was suspected to be hemorrhoidal, and longstanding GERD. She underwent EGD and colonoscopy just a couple weeks ago on 9/28/2023, her colonoscopy was unremarkable save for a few internal hemorrhoids felt to be the cause of her bright red blood per rectum, while her EGD noted grade A esophagitis and a single antral ulcer, with biopsies positive for goblet cell metaplasia for which a 1 year EGD follow-up was recommended. At this time the patient says she is feeling relatively well, her rectal bleeding has essentially resolved at this point, she says she does still however experiencing fairly constant sensation of burning in the epigastric region which did not seem to change very much since starting pantoprazole once daily.   She says that prior to her evaluation with us she had used NSAIDs regularly for a few days, she notes also that her father unfortunately passed away from appendiceal cancer recently and she had been drinking alcohol more than usual, although she has since cut back substantially. She says that intake of red wine however does make her epigastric pain worse. REVIEW OF SYSTEMS:      CONSTITUTIONAL: Denies any fever, chills, or rigors. Good appetite, and no recent weight loss. HEENT: No earache or tinnitus. Denies hearing loss or visual disturbances. CARDIOVASCULAR: No chest pain or palpitations. RESPIRATORY: Denies any cough, hemoptysis, shortness of breath or dyspnea on exertion. GASTROINTESTINAL: As noted in the History of Present Illness. GENITOURINARY: No problems with urination. Denies any hematuria or dysuria. NEUROLOGIC: No dizziness or vertigo, denies headaches. MUSCULOSKELETAL: Denies any muscle or joint pain. SKIN: Denies skin rashes or itching. ENDOCRINE: Denies excessive thirst. Denies intolerance to heat or cold. PSYCHOSOCIAL: Denies depression or anxiety. Denies any recent memory loss. Past Medical History:   Diagnosis Date    Anxiety     Depression 2020    GERD (gastroesophageal reflux disease)       Past Surgical History:   Procedure Laterality Date    MYRINGOTOMY       Social History     Socioeconomic History    Marital status: Single     Spouse name: Not on file    Number of children: Not on file    Years of education: Not on file    Highest education level: Not on file   Occupational History    Not on file   Tobacco Use    Smoking status: Never    Smokeless tobacco: Never   Vaping Use    Vaping Use: Never used   Substance and Sexual Activity    Alcohol use: Yes     Alcohol/week: 2.0 standard drinks of alcohol     Types: 2 Glasses of wine per week     Comment: socially    Drug use: No    Sexual activity: Yes     Partners: Male     Birth control/protection: I.U.D.    Other Topics Concern Not on file   Social History Narrative    Not on file     Social Determinants of Health     Financial Resource Strain: Not on file   Food Insecurity: Not on file   Transportation Needs: Not on file   Physical Activity: Not on file   Stress: Not on file   Social Connections: Not on file   Intimate Partner Violence: Not At Risk (8/13/2019)    Humiliation, Afraid, Rape, and Kick questionnaire     Fear of Current or Ex-Partner: No     Emotionally Abused: No     Physically Abused: No     Sexually Abused: No   Housing Stability: Not on file     Family History   Problem Relation Age of Onset    Depression Mother     Miscarriages / Stillbirths Mother     Osteoporosis Mother     Hypertension Father     Colon cancer Father         Dx in 2020. Reoccurrence in 2022    Cancer Paternal Grandmother         Pancreatic Cancer, terminal    Heart attack Maternal Grandfather         Late 50's early 63's. Terminal     Latex  Current Outpatient Medications   Medication Sig Dispense Refill    levonorgestrel (LILETTA) 18.6 MCG/DAY intrauterine device by Intrauterine route      pantoprazole (PROTONIX) 40 mg tablet Take 1 tablet (40 mg total) by mouth 2 (two) times a day for 60 days, THEN 1 tablet (40 mg total) daily. 150 tablet 0    sertraline (ZOLOFT) 50 mg tablet Take 1 tablet (50 mg total) by mouth daily 90 tablet 1    EPINEPHrine (EPIPEN) 0.3 mg/0.3 mL SOAJ Inject 0.3 mL (0.3 mg total) into a muscle once for 1 dose 0.6 mL 2    hydrocortisone (ANUSOL-HC) 25 mg suppository Insert 1 suppository (25 mg total) into the rectum 2 (two) times a day (Patient not taking: Reported on 10/11/2023) 12 suppository 0     No current facility-administered medications for this visit. Blood pressure 114/60, pulse 61, height 5' 6" (1.676 m), weight 73 kg (161 lb), SpO2 100 %. PHYSICAL EXAM:      General Appearance:   Alert, cooperative, no distress, appears stated age    HEENT:   Normocephalic, atraumatic, anicteric.      Neck:  Supple, symmetrical, trachea midline, no adenopathy;    thyroid: no enlargement/tenderness/nodules; no carotid  bruit or JVD    Lungs:   Clear to auscultation bilaterally; no rales, rhonchi or wheezing; respirations unlabored    Heart[de-identified]   S1 and S2 normal; regular rate and rhythm; no murmur, rub, or gallop.    Abdomen:   Soft, non-tender, non-distended; normal bowel sounds; no masses, no organomegaly    Extremities: No edema, erythema, wounds, rashes   Rectal:   Deferred                      No results found for: "WBC", "HGB", "HCT", "MCV", "PLT"  Lab Results   Component Value Date    GLUCOSE 92 06/14/2017    CALCIUM 9.4 06/14/2017     06/14/2017    K 4.5 06/14/2017    CO2 24 06/14/2017     06/14/2017    BUN 14 06/14/2017    CREATININE 0.90 06/14/2017     Lab Results   Component Value Date    ALT 13 06/14/2017    AST 16 06/14/2017    ALKPHOS 55 06/14/2017    BILITOT 0.8 06/14/2017     No results found for: "INR", "PROTIME"    Colonoscopy    Result Date: 9/28/2023  Impression: Normal colonoscopy to the terminal ileum Mild internal hemorrhoids on retroflexion, likely source of recent rectal bleeding RECOMMENDATION:  Repeat screening colonoscopy in 10 years Discharge home Resume regular diet Resume home medications Recommend 1 tablespoon of fiber supplement daily  Anusol suppositories to use when needed for rectal bleeding call with any abdominal pain, bleeding, fevers  Savi Stokes MD     EGD    Result Date: 9/28/2023  Impression: Normal duodenum Antral gastritis biopsies taken for H. pylori Clean-based antral ulcer with an eschar, appears to be healing Normal retroflexion Grade a reflux esophagitis RECOMMENDATION:  Await pathology results Discharge home Resume regular diet Avoid NSAIDs Continue pantoprazole Follow-up biopsy results Call with any abdominal pain, bleeding, fevers   Savi Stokes MD

## 2023-10-11 NOTE — ASSESSMENT & PLAN NOTE
Patient noted with antral ulcer on recent EGD, still experiencing burning epigastric pain at this time, may benefit from higher dose of PPI, she also reported alcohol and NSAID use relatively recently although has since cut back.     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 pylori infection.    -We will plan for EGD in 1 year, I explained biopsy findings to patient at this time and she expressed understanding    -For better treatment of her peptic ulcer we will increase her PPI therapy to twice daily and plan to remain at this dose for the next 2 months, then decrease to once daily therapy if she is improved at that time, I encouraged her to let us know if her symptoms are persisting or worsening at that point    -Advised patient about avoidance of NSAIDs, alcohol and other gastric irritants

## 2024-01-11 DIAGNOSIS — K21.00 GASTROESOPHAGEAL REFLUX DISEASE WITH ESOPHAGITIS, UNSPECIFIED WHETHER HEMORRHAGE: ICD-10-CM

## 2024-01-11 DIAGNOSIS — F32.9 REACTIVE DEPRESSION: ICD-10-CM

## 2024-01-11 RX ORDER — MECLIZINE HCL 12.5 MG/1
12.5 TABLET ORAL 3 TIMES DAILY PRN
COMMUNITY
Start: 2023-11-14

## 2024-01-11 RX ORDER — PANTOPRAZOLE SODIUM 40 MG/1
40 TABLET, DELAYED RELEASE ORAL 2 TIMES DAILY
Qty: 120 TABLET | Refills: 0 | Status: SHIPPED | OUTPATIENT
Start: 2024-01-11 | End: 2024-03-11

## 2024-01-11 NOTE — TELEPHONE ENCOUNTER
Hi yes my name is Frederick BROWN, Birth date November 20th, 1990. I was going to use my chart to refill my medications but I'm using a different pharmacy and it wouldn't let me add that. So if you could I need a refill on both. My just search online is the most important one. So sertraline 50 milligrams at the [facility] in [place], which is on [address]. So there's a [facility] in [place] [facility] [address] [facility] [address] [facility] That would be great. I'm going to need to also refill on my pantoprazole, but it's just going to be 40 milligrams once a day. So if you guys want to talk to me about that, that's fine. You can reach me at [phone]. Thank you

## 2024-03-26 DIAGNOSIS — F32.9 REACTIVE DEPRESSION: ICD-10-CM

## 2024-03-26 NOTE — TELEPHONE ENCOUNTER
Patient Comment: My insurance is requesting that I fill these in 90 day supplies but somehow the pharmacy I first picked them up from only filled a 30 day. I have an appointment with Dr. Mckeon next week but I'm going to run out before then. I don't have enough refills to fill a 90 day, can you please refill these for another 90 days so I don't have to pay out of pocket for 30 days?

## 2024-04-01 ENCOUNTER — OFFICE VISIT (OUTPATIENT)
Dept: FAMILY MEDICINE CLINIC | Facility: OTHER | Age: 34
End: 2024-04-01
Payer: COMMERCIAL

## 2024-04-01 VITALS
RESPIRATION RATE: 18 BRPM | SYSTOLIC BLOOD PRESSURE: 120 MMHG | OXYGEN SATURATION: 96 % | HEART RATE: 73 BPM | TEMPERATURE: 98 F | WEIGHT: 162.2 LBS | DIASTOLIC BLOOD PRESSURE: 70 MMHG | BODY MASS INDEX: 26.07 KG/M2 | HEIGHT: 66 IN

## 2024-04-01 DIAGNOSIS — F32.9 REACTIVE DEPRESSION: Primary | ICD-10-CM

## 2024-04-01 DIAGNOSIS — K21.00 GASTROESOPHAGEAL REFLUX DISEASE WITH ESOPHAGITIS, UNSPECIFIED WHETHER HEMORRHAGE: ICD-10-CM

## 2024-04-01 DIAGNOSIS — Z30.433 ENCOUNTER FOR IUD REMOVAL AND REINSERTION: ICD-10-CM

## 2024-04-01 PROCEDURE — 99213 OFFICE O/P EST LOW 20 MIN: CPT

## 2024-04-01 RX ORDER — PANTOPRAZOLE SODIUM 40 MG/1
40 TABLET, DELAYED RELEASE ORAL DAILY
Qty: 60 TABLET | Refills: 0 | Status: SHIPPED | OUTPATIENT
Start: 2024-04-01 | End: 2024-04-01 | Stop reason: CLARIF

## 2024-04-01 RX ORDER — PANTOPRAZOLE SODIUM 40 MG/1
40 TABLET, DELAYED RELEASE ORAL DAILY
Qty: 60 TABLET | Refills: 0 | Status: SHIPPED | OUTPATIENT
Start: 2024-04-01 | End: 2024-04-01

## 2024-04-01 RX ORDER — PANTOPRAZOLE SODIUM 40 MG/1
40 TABLET, DELAYED RELEASE ORAL DAILY
Qty: 90 TABLET | Refills: 0 | Status: SHIPPED | OUTPATIENT
Start: 2024-04-01

## 2024-04-01 NOTE — PROGRESS NOTES
Name: Frederick Rubio      : 1990      MRN: [id number]  Encounter Provider: Valencia Mckeon DO  Encounter Date: 2024   Encounter department: [facility]    Assessment & Plan     1. Reactive depression  -     sertraline (ZOLOFT) 50 mg tablet; Take 1 tablet (50 mg total) by mouth daily    2. Gastroesophageal reflux disease with esophagitis, unspecified whether hemorrhage    3. Encounter for IUD removal and reinsertion  -     Ambulatory Referral to Obstetrics / Gynecology; Future    Frederick presents to clinic today to follow up on mood.  She reports her mood has been worse somewhat recently following the recent holiday season after the loss of a close family member.  She overall has been having issues with some decreased interest in prior hobbies and sleeping more when she does not have things scheduled, but feels she is doing ok on her sertraline 50mg daily otherwise.  Will plan to continue with sertraline 50mg daily.  She states she has decreased her pantoprazole from 40mg BID to once daily, doing ok on this dose and is following up with GI.  Referral provided today to establish with new office for IUD removal and new IUD placement at her request; she states her prior office does not have her preferred IUD available.       Emotional and Mental Well-being, Sleep, Connectedness Assessment and Intervention:    PHQ-9 or GAD7 performed for initial evaluation or follow-up      Return in about 6 months (around 10/1/2024) for Annual physical, recheck mood.    Depression Screening and Follow-up Plan: Patient's depression screening was positive with a PHQ-9 score of 5. Patient with underlying depression and was advised to continue current medications as prescribed.         Subjective     Frederick presents to clinic today to recheck mood.  She reports she has been struggling recently with her mood, as she has had multiple increased life stressors including changes in relationships and moving.   She has noted she has decreased interest in some things, including previous hobbies which she used to enjoy.  She also notices she tends to sleep later in the mornings when she does not have things scheduled throughout the day, which is usual for her, but is now somewhat associated with low motivation.       Review of Systems   Constitutional:  Positive for appetite change (overeating a few days).   Psychiatric/Behavioral:  Positive for sleep disturbance (increased sleep some days).        Past Medical History:   Diagnosis Date    Anxiety     Depression 2020    GERD (gastroesophageal reflux disease)      Past Surgical History:   Procedure Laterality Date    MYRINGOTOMY       Family History   Problem Relation Age of Onset    Depression Mother     Miscarriages / Stillbirths Mother     Osteoporosis Mother     Hypertension Father     Colon cancer Father         Dx in 2020. Reoccurrence in 2022    Cancer Paternal Grandmother         Pancreatic Cancer, terminal    Heart attack Maternal Grandfather         Late 50's early 60's. Terminal     Social History     Socioeconomic History    Marital status: Single     Spouse name: None    Number of children: None    Years of education: None    Highest education level: None   Occupational History    None   Tobacco Use    Smoking status: Never    Smokeless tobacco: Never   Vaping Use    Vaping status: Never Used   Substance and Sexual Activity    Alcohol use: Yes     Alcohol/week: 2.0 standard drinks of alcohol     Types: 2 Glasses of wine per week     Comment: socially    Drug use: No    Sexual activity: Yes     Partners: Male     Birth control/protection: I.U.D.   Other Topics Concern    None   Social History Narrative    None     Social Determinants of Health     Financial Resource Strain: Not on file   Food Insecurity: Not on file   Transportation Needs: Not on file   Physical Activity: Not on file   Stress: Not on file   Social Connections: Not on file   Intimate Partner  Violence: Not At Risk (8/13/2019)    Humiliation, Afraid, Rape, and Kick questionnaire     Fear of Current or Ex-Partner: No     Emotionally Abused: No     Physically Abused: No     Sexually Abused: No   Housing Stability: Not on file     Current Outpatient Medications on File Prior to Visit   Medication Sig    levonorgestrel (LILETTA) 18.6 MCG/DAY intrauterine device by Intrauterine route    meclizine (ANTIVERT) 12.5 MG tablet Take 12.5 mg by mouth 3 (three) times a day as needed for dizziness    [DISCONTINUED] pantoprazole (PROTONIX) 40 mg tablet Take 1 tablet (40 mg total) by mouth 2 (two) times a day    [DISCONTINUED] sertraline (ZOLOFT) 50 mg tablet Take 1 tablet (50 mg total) by mouth daily    EPINEPHrine (EPIPEN) 0.3 mg/0.3 mL SOAJ Inject 0.3 mL (0.3 mg total) into a muscle once for 1 dose (Patient not taking: Reported on 4/1/2024)    hydrocortisone (ANUSOL-HC) 25 mg suppository Insert 1 suppository (25 mg total) into the rectum 2 (two) times a day (Patient not taking: Reported on 10/11/2023)     Allergies   Allergen Reactions    Latex Rash     Immunization History   Administered Date(s) Administered    COVID-19 Pfizer Vac BIVALENT Angel-sucrose 12 Yr+ IM 05/30/2023    DTP 03/20/1992, 06/01/1992, 08/17/1992, 05/25/1993, 12/03/1996    HPV Quadrivalent 02/27/2007, 05/01/2007, 02/12/2008    Hep B, Adolescent or Pediatric 10/07/1993, 12/07/1993, 01/19/1994, 11/30/1994    Hep B, adult 10/07/1993, 11/30/1994, 01/19/1995    HiB 12/07/1993, 01/19/1994, 11/30/1994    INFLUENZA 10/20/2016, 10/30/2021    Influenza, seasonal, injectable 10/20/2016    MMR 05/25/1993, 12/18/1997    Meningococcal MCV4, Unspecified 05/25/2007, 07/07/2010    Meningococcal MCV4P 05/25/2007, 07/07/2010    Meningococcal, Unknown Serogroups 07/07/2010    OPV 01/21/1992, 03/25/1992, 05/25/1993, 12/03/1996    Td (adult), adsorbed 01/01/2001    Tdap 02/27/2007, 07/07/2010, 05/18/2021    Tuberculin Skin Test-PPD Intradermal 05/18/2021, 06/01/2021     "Varicella 1995, 2007, 2007, 2017       Objective     /70   Pulse 73   Temp 98 °F (36.7 °C)   Resp 18   Ht 5' 6\" (1.676 m)   Wt 73.6 kg (162 lb 3.2 oz)   SpO2 96%   BMI 26.18 kg/m²     Physical Exam  Constitutional:       General: She is not in acute distress.     Appearance: Normal appearance. She is not ill-appearing, toxic-appearing or diaphoretic.   Eyes:      Extraocular Movements: Extraocular movements intact.      Conjunctiva/sclera: Conjunctivae normal.   Cardiovascular:      Rate and Rhythm: Normal rate.   Pulmonary:      Effort: Pulmonary effort is normal.   Musculoskeletal:         General: Normal range of motion.   Neurological:      Mental Status: She is alert and oriented to person, place, and time. Mental status is at baseline.   Psychiatric:         Mood and Affect: Mood normal.         Behavior: Behavior normal.         PHQ-2/9 Depression Screening    Little interest or pleasure in doing things: 1 - several days  Feeling down, depressed, or hopeless: 1 - several days  Trouble falling or staying asleep, or sleeping too much: 1 - several days  Feeling tired or having little energy: 0 - not at all  Poor appetite or overeatin - several days  Feeling bad about yourself - or that you are a failure or have let yourself or your family down: 0 - not at all  Trouble concentrating on things, such as reading the newspaper or watching television: 0 - not at all  Moving or speaking so slowly that other people could have noticed. Or the opposite - being so fidgety or restless that you have been moving around a lot more than usual: 1 - several days  Thoughts that you would be better off dead, or of hurting yourself in some way: 0 - not at all  PHQ-9 Score: 5  PHQ-9 Interpretation: Mild depression         Valencia Mckeon, DO    "

## 2024-04-16 ENCOUNTER — TELEPHONE (OUTPATIENT)
Dept: GASTROENTEROLOGY | Facility: CLINIC | Age: 34
End: 2024-04-16

## 2024-05-05 NOTE — PROGRESS NOTES
ASSESSMENT & PLAN:   Diagnoses and all orders for this visit:    Well woman exam with routine gynecological exam    Encounter for IUD removal and reinsertion  -     Ambulatory Referral to Obstetrics / Gynecology    The following were reviewed in today's visit: ASCCP guidelines, Gardisil vaccination, STD testing breast self exam, STD testing, exercise, and healthy diet.    Patient to return to office in yearly for annual exam.     All questions have been answered to her satisfaction.    CC:  Annual Gynecologic Examination  Chief Complaint   Patient presents with    New Patient Visit     Patient is new to our practice, establish care.     Gynecologic Exam     Patient is here today for her yearly exam, pap up to date(22-wnl). Patient is doing well, she has no concerns at this time. Will be scheduling an IUD removal and reinsertion in Dec.        HPI: Frederick Rubio is a 33 y.o.  who presents for annual gynecologic examination.  She has the following concerns:  none.   She is a new patient to our practice but has been seen by [facility], last appt in . Liletta IUD was placed 2017. Gets spotting every once in a while. Planning for removal/reinsertion in 2024.  Last pap 2022, NILM/NEG HPV.     Health Maintenance:    Exercise: daily, weight lifting   Breast exams/breast awareness: yes    Past Medical History:   Diagnosis Date    Anxiety     Depression     GERD (gastroesophageal reflux disease)        Past Surgical History:   Procedure Laterality Date    MYRINGOTOMY         Past OB/Gyn History:   No LMP recorded (lmp unknown). Patient has had an implant.    Last Pap: 2022 : no abnormalities  History of abnormal Pap smear: no  HPV vaccine completed: yes    Patient is currently sexually active.   STD testing: no  Current contraception: IUD    Family History  Family History   Problem Relation Age of Onset    Depression Mother     Miscarriages / Stillbirths Mother     Osteoporosis  Mother     Hypertension Father     Colon cancer Father         Appendiceal. Dx in 2020. Reoccurrence in 2022. Terminal    Fibroids Sister     Heart attack Maternal Grandfather         Late 50's early 60's. Terminal    Cancer Paternal Grandmother         Pancreatic Cancer, terminal   Family history of uterine or ovarian cancer: no  Family history of breast cancer: no  Family history of colon cancer: yes, father    Social History:  Social History     Socioeconomic History    Marital status: Single     Spouse name: Not on file    Number of children: Not on file    Years of education: Not on file    Highest education level: Not on file   Occupational History    Not on file   Tobacco Use    Smoking status: Never    Smokeless tobacco: Never   Vaping Use    Vaping status: Never Used   Substance and Sexual Activity    Alcohol use: Yes     Alcohol/week: 4.0 standard drinks of alcohol     Types: 4 Standard drinks or equivalent per week     Comment: socially    Drug use: No    Sexual activity: Not Currently     Partners: Male     Birth control/protection: I.U.D.   Other Topics Concern    Not on file   Social History Narrative    Not on file     Social Determinants of Health     Financial Resource Strain: Not on file   Food Insecurity: Not on file   Transportation Needs: Not on file   Physical Activity: Not on file   Stress: Not on file   Social Connections: Not on file   Intimate Partner Violence: Not At Risk (8/13/2019)    Humiliation, Afraid, Rape, and Kick questionnaire     Fear of Current or Ex-Partner: No     Emotionally Abused: No     Physically Abused: No     Sexually Abused: No   Housing Stability: Not on file     Domestic violence screen: negative    Allergies:  Allergies   Allergen Reactions    Latex Rash       Medications:    Current Outpatient Medications:     levonorgestrel (LILETTA) 18.6 MCG/DAY intrauterine device, by Intrauterine route, Disp: , Rfl:     meclizine (ANTIVERT) 12.5 MG tablet, Take 12.5 mg by mouth  "3 (three) times a day as needed for dizziness, Disp: , Rfl:     pantoprazole (PROTONIX) 40 mg tablet, Take 1 tablet (40 mg total) by mouth daily, Disp: 90 tablet, Rfl: 0    sertraline (ZOLOFT) 50 mg tablet, Take 1 tablet (50 mg total) by mouth daily, Disp: 90 tablet, Rfl: 1    EPINEPHrine (EPIPEN) 0.3 mg/0.3 mL SOAJ, Inject 0.3 mL (0.3 mg total) into a muscle once for 1 dose (Patient not taking: Reported on 4/1/2024), Disp: 0.6 mL, Rfl: 2    hydrocortisone (ANUSOL-HC) 25 mg suppository, Insert 1 suppository (25 mg total) into the rectum 2 (two) times a day (Patient not taking: Reported on 10/11/2023), Disp: 12 suppository, Rfl: 0    Review of Systems:  Review of Systems   Constitutional:  Negative for chills and fever.   Respiratory:  Negative for shortness of breath.    Cardiovascular:  Negative for chest pain.   Gastrointestinal:  Negative for abdominal pain, constipation and diarrhea.   Genitourinary:  Negative for dysuria, frequency, pelvic pain, vaginal discharge and vaginal pain.   Psychiatric/Behavioral:  Negative for self-injury and suicidal ideas.          Physical Exam:  /62 (BP Location: Right arm, Patient Position: Sitting, Cuff Size: Large)   Ht 5' 6\" (1.676 m)   Wt 73 kg (161 lb)   LMP  (LMP Unknown) Comment: spot  BMI 25.99 kg/m²    Physical Exam  Constitutional:       Appearance: Normal appearance.   Genitourinary:      Vulva and urethral meatus normal.      No labial fusion noted.      No vaginal erythema or tenderness.        Right Adnexa: not tender.     Left Adnexa: not tender.     No cervical discharge or friability.      IUD strings visualized.      Uterus is not tender.   Breasts:     Breasts are symmetrical.      Right: Normal.      Left: Normal.   HENT:      Head: Normocephalic and atraumatic.   Neck:      Thyroid: No thyroid mass or thyroid tenderness.   Cardiovascular:      Rate and Rhythm: Normal rate and regular rhythm.      Heart sounds: Normal heart sounds. No murmur " heard.  Pulmonary:      Effort: Pulmonary effort is normal.      Breath sounds: Normal breath sounds. No wheezing.   Abdominal:      Palpations: Abdomen is soft. There is no mass.      Tenderness: There is no abdominal tenderness.   Lymphadenopathy:      Cervical: No cervical adenopathy.   Neurological:      General: No focal deficit present.      Mental Status: She is alert and oriented to person, place, and time.   Skin:     General: Skin is warm and dry.   Psychiatric:         Mood and Affect: Mood normal.         Behavior: Behavior normal.   Vitals and nursing note reviewed.

## 2024-05-06 ENCOUNTER — OFFICE VISIT (OUTPATIENT)
Dept: OBGYN CLINIC | Facility: CLINIC | Age: 34
End: 2024-05-06
Payer: COMMERCIAL

## 2024-05-06 VITALS
BODY MASS INDEX: 25.88 KG/M2 | WEIGHT: 161 LBS | SYSTOLIC BLOOD PRESSURE: 116 MMHG | HEIGHT: 66 IN | DIASTOLIC BLOOD PRESSURE: 62 MMHG

## 2024-05-06 DIAGNOSIS — Z30.433 ENCOUNTER FOR IUD REMOVAL AND REINSERTION: ICD-10-CM

## 2024-05-06 DIAGNOSIS — Z01.419 WELL WOMAN EXAM WITH ROUTINE GYNECOLOGICAL EXAM: Primary | ICD-10-CM

## 2024-05-06 PROCEDURE — 99395 PREV VISIT EST AGE 18-39: CPT

## 2024-06-24 DIAGNOSIS — F32.9 REACTIVE DEPRESSION: ICD-10-CM

## 2024-07-16 ENCOUNTER — TELEPHONE (OUTPATIENT)
Dept: GASTROENTEROLOGY | Facility: CLINIC | Age: 34
End: 2024-07-16

## 2024-07-16 NOTE — TELEPHONE ENCOUNTER
Patient is due in early October for an EGD recall with Dr. Waters for intestinal Metaplasia. Called and spoke with patient and she doesn't wish to schedule anything at this time.

## 2024-11-04 ENCOUNTER — TELEPHONE (OUTPATIENT)
Age: 34
End: 2024-11-04

## 2024-11-04 NOTE — TELEPHONE ENCOUNTER
Patient called office stating she called insurance company to verify coverage of Lyletta IUD. She states she was it is covered. She would like to confirm this is the IUD that is planned for insertion. Informed her this is noted on her appointment notes and states it was checked for coverage as well.

## 2024-11-17 ENCOUNTER — TELEMEDICINE (OUTPATIENT)
Dept: OTHER | Facility: HOSPITAL | Age: 34
End: 2024-11-17
Payer: COMMERCIAL

## 2024-11-17 DIAGNOSIS — J06.9 UPPER RESPIRATORY TRACT INFECTION, UNSPECIFIED TYPE: Primary | ICD-10-CM

## 2024-11-17 PROCEDURE — 99213 OFFICE O/P EST LOW 20 MIN: CPT | Performed by: PHYSICIAN ASSISTANT

## 2024-11-17 RX ORDER — DOXYCYCLINE HYCLATE 100 MG
100 TABLET ORAL 2 TIMES DAILY
Qty: 14 TABLET | Refills: 0 | Status: SHIPPED | OUTPATIENT
Start: 2024-11-17 | End: 2024-11-24

## 2024-11-17 RX ORDER — PREDNISONE 20 MG/1
40 TABLET ORAL DAILY
Qty: 6 TABLET | Refills: 0 | Status: SHIPPED | OUTPATIENT
Start: 2024-11-17 | End: 2024-11-20

## 2024-11-17 NOTE — PROGRESS NOTES
Virtual Regular Visit  Name: Frederick Rubio      : 1990      MRN: [id number]  Encounter Provider: Your Video Visit Provider  Encounter Date: 2024   Encounter department: [facility]     Verification of patient location:    Patient is located at Home in the following state in which I hold an active license [place]    VIRTUAL CARE DOCUMENTATION:     1. This service was provided via Telemedicine using Epic Embedded platform    2. Parties in the room with patient during televisit: No one else    3. Confidentiality My office door was closed     4. Participants No one else was in the room    5. Patient acknowledged consent and understanding of privacy and security of the  Telemedicine platform. I informed the patient that I have reviewed their record in Epic and presented the opportunity for them to ask any questions regarding the visit today. The patient has agreed to participate and understands they can discontinue the visit at any time.        :  Assessment & Plan  Upper respiratory tract infection, unspecified type    Orders:    predniSONE 20 mg tablet; Take 2 tablets (40 mg total) by mouth daily for 3 days    doxycycline hyclate (VIBRA-TABS) 100 mg tablet; Take 1 tablet (100 mg total) by mouth 2 (two) times a day for 7 days        Encounter provider Your Video Visit Provider    Patient is aware this is a billable service.     History of Present Illness History of Present Illness     Pt reports cold sx about 7 days ago- had fevers chills which are since resolved. Was slightly improving, but worse for the past 3 days, PND, congestion, voice changes, cough productive of yellow thick sputum. Mucinex, allegra, humidifier.       Review of Systems   Constitutional:  Negative for fever.   HENT:  Positive for congestion, postnasal drip, rhinorrhea, sinus pressure and voice change. Negative for nosebleeds and sore throat.    Eyes:  Negative for redness.   Respiratory:  Positive for cough. Negative for shortness of  breath.    Cardiovascular:  Negative for chest pain.   Gastrointestinal:  Negative for blood in stool.   Genitourinary:  Negative for hematuria.   Musculoskeletal:  Negative for gait problem.   Skin:  Negative for rash.   Neurological:  Negative for seizures and headaches.   Psychiatric/Behavioral:  Negative for behavioral problems.      Objective     There were no vitals taken for this visit.  Physical Exam  Constitutional:       General: She is not in acute distress.     Appearance: Normal appearance. She is ill-appearing (mild). She is not toxic-appearing.   HENT:      Head: Normocephalic and atraumatic.      Nose: No rhinorrhea.      Mouth/Throat:      Mouth: Mucous membranes are moist.      Comments: Sounds hoarse  Eyes:      Conjunctiva/sclera: Conjunctivae normal.   Pulmonary:      Effort: Pulmonary effort is normal. No respiratory distress.      Breath sounds: No wheezing (no gross audible wheeze through computer).   Musculoskeletal:      Cervical back: Normal range of motion.   Skin:     Findings: No rash (on face or neck).   Neurological:      Mental Status: She is alert.      Cranial Nerves: No dysarthria or facial asymmetry.   Psychiatric:         Mood and Affect: Mood normal.         Behavior: Behavior normal.         Visit Time   I have spent a total time of 10 minutes in caring for this patient on the day of the visit/encounter including Prognosis, Risks and benefits of tx options, Instructions for management, Patient education, Impressions, Documenting in the medical record, and Obtaining and/or reviewing history, not including the time spent for establishing the audio/video connection.

## 2024-11-18 ENCOUNTER — PROCEDURE VISIT (OUTPATIENT)
Dept: OBGYN CLINIC | Facility: CLINIC | Age: 34
End: 2024-11-18
Payer: COMMERCIAL

## 2024-11-18 VITALS
DIASTOLIC BLOOD PRESSURE: 70 MMHG | HEIGHT: 66 IN | BODY MASS INDEX: 26.07 KG/M2 | WEIGHT: 162.2 LBS | SYSTOLIC BLOOD PRESSURE: 120 MMHG

## 2024-11-18 DIAGNOSIS — Z30.433 ENCOUNTER FOR IUD REMOVAL AND REINSERTION: Primary | ICD-10-CM

## 2024-11-18 PROCEDURE — 58300 INSERT INTRAUTERINE DEVICE: CPT

## 2024-11-18 PROCEDURE — 58301 REMOVE INTRAUTERINE DEVICE: CPT

## 2024-11-18 NOTE — PROGRESS NOTES
IUD Procedure    Date/Time: 11/18/2024 11:28 AM    Performed by: Terri Neol PA-C  Authorized by: Terri Noel PA-C    Other Assisting Provider: Yes (comment) (Dr. Margot Clemente)    Verbal consent obtained?: Yes    Written consent obtained?: Yes    Risks and benefits: Risks, benefits and alternatives were discussed    Consent given by:  Patient  Patient states understanding of procedure being performed: Yes    Patient's understanding of procedure matches consent: Yes    Procedure consent matches procedure scheduled: Yes    Required items: Required blood products, implants, devices and special equipment available    Patient identity confirmed:  Verbally with patient  Select procedure: IUD removal and insertion    IUD Insertion:     Pelvic exam performed: yes      Cervix cleaned and prepped: yes      Speculum placed in vagina: yes      Tenaculum applied to cervix: yes      IUD inserted with no complications: yes (Was unable to get IUD to load properly with first device. Second device was obtained and insertion was completed with assistance of .)      Strings trimmed: yes      Uterus sounded: yes      Uterus sound depth (cm):  6    IUD type:  1 each levonorgestrel 20.1 mcg/day  Post-procedure:     Patient tolerated procedure well: yes      Patient will follow up after next period: yes    Insertion Comments:      Patient presented today for an IUD insertion for contraception and bleeding control . She is feeling well at this time and has no complaints. The patient was placed in dorsal lithotomy position and the speculum was inserted into the vagina. The cervix was prepped and tenaculum placed on the anterior lip. The uterus was sounded to 6 cm. Initially, IUD was unable to be loaded into device and a second device was obtained. It was adequately loaded. The IUD was placed without difficulty and the strings trimmed. The tenaculum was removed. Bleeding from tenaculum site was controlled  using pressure . The patient tolerated the procedure well and without complication. Patient was advised to use warm compresses and take OTC pain relievers for pain control over the next few days. Risks, benefits and alternatives were discussed with the patient. We discussed possible complications and risks, including infection, bleeding, pelvic pain, expulsion, failure to protect against pregnancy, uterine perforation with need for laparoscopy to retrieve the device, migration of device and increased risk of ectopic should pregnancy occur.  We reviewed irregular bleeding patterns that should improve with time, with high level of patient satisfaction regarding bleeding profile at 1 year.  All questions were answered to apparent satisfaction. Patient is to RTO in 4-6 weeks for IUD string check.       IUD Removal:     Reason for removal comment:  Expiry    Removed without complications: yes      Strings visualized: yes      IUD intact: yes    Removal Comments:      The patient presented for IUD removal today due to expiry. She is feeling well at this time. Patient was placed in dorsal lithotomy position and a speculum was inserted into the vagina. The IUD strings were visualized at the cervical os. The strings were grasped with ringed forceps and steady traction was applied. The IUD was removed intact and without complication. The patient tolerated the procedure well.

## 2024-12-02 ENCOUNTER — OFFICE VISIT (OUTPATIENT)
Age: 34
End: 2024-12-02
Payer: COMMERCIAL

## 2024-12-02 VITALS
OXYGEN SATURATION: 98 % | HEIGHT: 66 IN | WEIGHT: 162.8 LBS | TEMPERATURE: 98 F | BODY MASS INDEX: 26.16 KG/M2 | DIASTOLIC BLOOD PRESSURE: 60 MMHG | SYSTOLIC BLOOD PRESSURE: 112 MMHG | HEART RATE: 78 BPM

## 2024-12-02 DIAGNOSIS — Z00.00 ANNUAL PHYSICAL EXAM: Primary | ICD-10-CM

## 2024-12-02 DIAGNOSIS — F41.9 ANXIETY: ICD-10-CM

## 2024-12-02 DIAGNOSIS — K64.8 INTERNAL HEMORRHOIDS: ICD-10-CM

## 2024-12-02 DIAGNOSIS — Z91.040 LATEX ALLERGY: ICD-10-CM

## 2024-12-02 DIAGNOSIS — Z13.1 SCREENING FOR DIABETES MELLITUS: ICD-10-CM

## 2024-12-02 DIAGNOSIS — Z13.6 SCREENING FOR CARDIOVASCULAR CONDITION: ICD-10-CM

## 2024-12-02 PROCEDURE — 99395 PREV VISIT EST AGE 18-39: CPT | Performed by: FAMILY MEDICINE

## 2024-12-02 RX ORDER — EPINEPHRINE 0.3 MG/.3ML
0.3 INJECTION SUBCUTANEOUS ONCE
Qty: 0.6 ML | Refills: 2 | Status: SHIPPED | OUTPATIENT
Start: 2024-12-02 | End: 2024-12-02

## 2024-12-02 RX ORDER — HYDROCORTISONE ACETATE 25 MG/1
25 SUPPOSITORY RECTAL 2 TIMES DAILY
Qty: 12 SUPPOSITORY | Refills: 3 | Status: SHIPPED | OUTPATIENT
Start: 2024-12-02

## 2024-12-02 NOTE — PROGRESS NOTES
Adult Annual Physical  Name: Frederick Rubio      : 1990      MRN: [id number]  Encounter Provider: Gayle Sherman DO  Encounter Date: 2024   Encounter department: [facility]    Assessment & Plan  Annual physical exam         BMI 26.0-26.9,adult    Orders:    Comprehensive metabolic panel; Future    Lipid Panel with Direct LDL reflex; Future    TSH, 3rd generation with Free T4 reflex; Future    Anxiety  Stable with lifestyle modifications    Orders:    TSH, 3rd generation with Free T4 reflex; Future    Screening for cardiovascular condition    Orders:    Lipid Panel with Direct LDL reflex; Future    Screening for diabetes mellitus    Orders:    Comprehensive metabolic panel; Future    Internal hemorrhoids    Orders:    hydrocortisone (ANUSOL-HC) 25 mg suppository; Insert 1 suppository (25 mg total) into the rectum 2 (two) times a day    Latex allergy    Orders:    EPINEPHrine (EPIPEN) 0.3 mg/0.3 mL SOAJ; Inject 0.3 mL (0.3 mg total) into a muscle once for 1 dose      Immunizations and preventive care screenings were discussed with patient today. Appropriate education was printed on patient's after visit summary.    Counseling:  Alcohol/drug use: discussed moderation in alcohol intake, the recommendations for healthy alcohol use, and avoidance of illicit drug use.  Dental Health: discussed importance of regular tooth brushing, flossing, and dental visits.  Injury prevention: discussed safety/seat belts, safety helmets, smoke detectors, carbon monoxide detectors, and smoking near bedding or upholstery.  Sexual health: discussed sexually transmitted diseases, partner selection, use of condoms, avoidance of unintended pregnancy, and contraceptive alternatives.  Exercise: the importance of regular exercise/physical activity was discussed. Recommend exercise 3-5 times per week for at least 30 minutes.          Return in about 1 year (around 2025) for Annual physical.    The  patient indicates understanding of these issues and agrees with the plan.              History of Present Illness     Adult Annual Physical:  Patient presents for annual physical.     Diet and Physical Activity:  - Diet/Nutrition: well balanced diet, portion control, limited junk food, consuming 3-5 servings of fruits/vegetables daily and adequate whole grain intake.  - Exercise: strength training exercises, moderate cardiovascular exercise, 3-4 times a week on average and 1-2 hours on average.    General Health:  - Sleep: sleeps well and 7-8 hours of sleep on average.  - Hearing: normal hearing bilateral ears.  - Vision: goes for regular eye exams.  - Dental: regular dental visits.    /GYN Health:  - Follows with GYN: yes.   - Menopause: premenopausal.   - Contraception: IUD placement.        Review of Systems   Constitutional:  Negative for appetite change, fatigue, fever and unexpected weight change.   HENT:  Negative for congestion, dental problem, ear pain, postnasal drip, sore throat and tinnitus.    Eyes:  Negative for pain, discharge and visual disturbance.   Respiratory:  Negative for cough, shortness of breath and wheezing.    Cardiovascular:  Negative for chest pain, palpitations and leg swelling.   Gastrointestinal:  Negative for abdominal pain, constipation, diarrhea, nausea and vomiting.   Endocrine: Negative for cold intolerance and heat intolerance.   Genitourinary:  Negative for difficulty urinating, dysuria, flank pain and urgency.   Musculoskeletal:  Negative for arthralgias, back pain, joint swelling and myalgias.   Skin:  Negative for rash and wound.   Allergic/Immunologic: Negative for immunocompromised state.   Neurological:  Negative for dizziness, syncope, speech difficulty, weakness and numbness.   Hematological:  Negative for adenopathy. Does not bruise/bleed easily.   Psychiatric/Behavioral:  Negative for confusion, dysphoric mood and sleep disturbance. The patient is not  "nervous/anxious.      Medical History Reviewed by provider this encounter:  Tobacco  Allergies  Meds  Problems  Med Hx  Surg Hx  Fam Hx     .    Objective   /60   Pulse 78   Temp 98 °F (36.7 °C)   Ht 5' 6\" (1.676 m)   Wt 73.8 kg (162 lb 12.8 oz)   SpO2 98%   BMI 26.28 kg/m²     Physical Exam  Vitals and nursing note reviewed.   Constitutional:       General: She is not in acute distress.     Appearance: Normal appearance. She is well-developed.      Comments: Body mass index is 26.28 kg/m².    HENT:      Head: Normocephalic and atraumatic.      Right Ear: Hearing, tympanic membrane, ear canal and external ear normal.      Left Ear: Hearing, tympanic membrane, ear canal and external ear normal.      Nose: Nose normal.      Mouth/Throat:      Mouth: Mucous membranes are moist.      Pharynx: Uvula midline. No oropharyngeal exudate.   Eyes:      General: No scleral icterus.     Conjunctiva/sclera: Conjunctivae normal.      Pupils: Pupils are equal, round, and reactive to light.   Neck:      Thyroid: No thyromegaly.   Cardiovascular:      Rate and Rhythm: Normal rate and regular rhythm.      Heart sounds: Normal heart sounds. No murmur heard.  Pulmonary:      Effort: Pulmonary effort is normal. No respiratory distress.      Breath sounds: Normal breath sounds. No wheezing or rales.   Abdominal:      General: Bowel sounds are normal.      Palpations: Abdomen is soft. There is no mass.      Tenderness: There is no abdominal tenderness.   Musculoskeletal:         General: No tenderness. Normal range of motion.      Cervical back: Normal range of motion and neck supple.      Right lower leg: No edema.      Left lower leg: No edema.   Lymphadenopathy:      Cervical: No cervical adenopathy.   Skin:     General: Skin is warm and dry.      Coloration: Skin is not jaundiced.      Findings: No erythema or rash.   Neurological:      General: No focal deficit present.      Mental Status: She is alert and oriented " to person, place, and time.      Cranial Nerves: No cranial nerve deficit.      Deep Tendon Reflexes: Reflexes are normal and symmetric.   Psychiatric:         Mood and Affect: Mood normal.         Behavior: Behavior normal.

## 2024-12-02 NOTE — ASSESSMENT & PLAN NOTE
Stable with lifestyle modifications    Orders:    TSH, 3rd generation with Free T4 reflex; Future

## 2024-12-02 NOTE — PATIENT INSTRUCTIONS
"Patient Education     Routine physical for adults   The Basics   Written by the doctors and editors at [facility]   What is a physical? -- A physical is a routine visit, or \"check-up,\" with your doctor. You might also hear it called a \"wellness visit\" or \"preventive visit.\"  During each visit, the doctor will:   Ask about your physical and mental health   Ask about your habits, behaviors, and lifestyle   Do an exam   Give you vaccines if needed   Talk to you about any medicines you take   Give advice about your health   Answer your questions  Getting regular check-ups is an important part of taking care of your health. It can help your doctor find and treat any problems you have. But it's also important for preventing health problems.  A routine physical is different from a \"sick visit.\" A sick visit is when you see a doctor because of a health concern or problem. Since physicals are scheduled ahead of time, you can think about what you want to ask the doctor.  How often should I get a physical? -- It depends on your age and health. In general, for people age 21 years and older:   If you are younger than 50 years, you might be able to get a physical every 3 years.   If you are 50 years or older, your doctor might recommend a physical every year.  If you have an ongoing health condition, like diabetes or high blood pressure, your doctor will probably want to see you more often.  What happens during a physical? -- In general, each visit will include:   Physical exam - The doctor or nurse will check your height, weight, heart rate, and blood pressure. They will also look at your eyes and ears. They will ask about how you are feeling and whether you have any symptoms that bother you.   Medicines - It's a good idea to bring a list of all the medicines you take to each doctor visit. Your doctor will talk to you about your medicines and answer any questions. Tell them if you are having any side effects that bother you. You " "should also tell them if you are having trouble paying for any of your medicines.   Habits and behaviors - This includes:   Your diet   Your exercise habits   Whether you smoke, drink alcohol, or use drugs   Whether you are sexually active   Whether you feel safe at home  Your doctor will talk to you about things you can do to improve your health and lower your risk of health problems. They will also offer help and support. For example, if you want to quit smoking, they can give you advice and might prescribe medicines. If you want to improve your diet or get more physical activity, they can help you with this, too.   Lab tests, if needed - The tests you get will depend on your age and situation. For example, your doctor might want to check your:   Cholesterol   Blood sugar   Iron level   Vaccines - The recommended vaccines will depend on your age, health, and what vaccines you already had. Vaccines are very important because they can prevent certain serious or deadly infections.   Discussion of screening - \"Screening\" means checking for diseases or other health problems before they cause symptoms. Your doctor can recommend screening based on your age, risk, and preferences. This might include tests to check for:   Cancer, such as breast, prostate, cervical, ovarian, colorectal, prostate, lung, or skin cancer   Sexually transmitted infections, such as chlamydia and gonorrhea   Mental health conditions like depression and anxiety  Your doctor will talk to you about the different types of screening tests. They can help you decide which screenings to have. They can also explain what the results might mean.   Answering questions - The physical is a good time to ask the doctor or nurse questions about your health. If needed, they can refer you to other doctors or specialists, too.  Adults older than 65 years often need other care, too. As you get older, your doctor will talk to you about:   How to prevent falling at " home   Hearing or vision tests   Memory testing   How to take your medicines safely   Making sure that you have the help and support you need at home  All topics are updated as new evidence becomes available and our peer review process is complete.  This topic retrieved from [employer] on: May 02, 2024.  Topic 913877 Version 1.0  Release: 32.4.3 - C32.122  © 2024 UpToDate, Inc. and/or its affiliates. All rights reserved.  Consumer Information Use and Disclaimer   Disclaimer: This generalized information is a limited summary of diagnosis, treatment, and/or medication information. It is not meant to be comprehensive and should be used as a tool to help the user understand and/or assess potential diagnostic and treatment options. It does NOT include all information about conditions, treatments, medications, side effects, or risks that may apply to a specific patient. It is not intended to be medical advice or a substitute for the medical advice, diagnosis, or treatment of a health care provider based on the health care provider's examination and assessment of a patient's specific and unique circumstances. Patients must speak with a health care provider for complete information about their health, medical questions, and treatment options, including any risks or benefits regarding use of medications. This information does not endorse any treatments or medications as safe, effective, or approved for treating a specific patient. UpToDate, Inc. and its affiliates disclaim any warranty or liability relating to this information or the use thereof.The use of this information is governed by the Terms of Use, available at https://www.woltersTechnimotionuwer.com/en/know/clinical-effectiveness-terms. 2024© UpToDate, Inc. and its affiliates and/or licensors. All rights reserved.  Copyright   © 2024 UpToDate, Inc. and/or its affiliates. All rights reserved.

## 2024-12-20 ENCOUNTER — OFFICE VISIT (OUTPATIENT)
Dept: OBGYN CLINIC | Facility: CLINIC | Age: 34
End: 2024-12-20
Payer: COMMERCIAL

## 2024-12-20 VITALS
WEIGHT: 164 LBS | BODY MASS INDEX: 26.36 KG/M2 | DIASTOLIC BLOOD PRESSURE: 70 MMHG | SYSTOLIC BLOOD PRESSURE: 102 MMHG | HEIGHT: 66 IN

## 2024-12-20 DIAGNOSIS — Z30.431 ENCOUNTER FOR ROUTINE CHECKING OF INTRAUTERINE CONTRACEPTIVE DEVICE (IUD): Primary | ICD-10-CM

## 2024-12-20 PROCEDURE — 99212 OFFICE O/P EST SF 10 MIN: CPT

## 2024-12-20 NOTE — PROGRESS NOTES
Assessment/Plan:   - IUD strings in place, visualized within cervical os     Diagnoses and all orders for this visit:    Encounter for routine checking of intrauterine contraceptive device (IUD)        Subjective:     Patient ID: Frederick Rubio is a 34 y.o. female.    Patient is a 35 y/o G0 F presenting to the office for IUD string check. Liletta IUD inserted 11/18/2024. She has been feeling well since insertion. She did perform a self check and could feel the strings. She has mild spotting following insertion for 3-4 days and has not had bleeding since.       Objective:     Physical Exam  Vitals and nursing note reviewed.   Constitutional:       Appearance: Normal appearance.   HENT:      Head: Normocephalic and atraumatic.   Genitourinary:     General: Normal vulva.      Exam position: Lithotomy position.      Vagina: No vaginal discharge.      Cervix: Normal.      Uterus: Normal.       Adnexa: Right adnexa normal and left adnexa normal.      Comments: IUD strings visualized and in place   Neurological:      General: No focal deficit present.      Mental Status: She is alert and oriented to person, place, and time.   Psychiatric:         Mood and Affect: Mood normal.         Behavior: Behavior normal.           I have spent a total time of 15 minutes in caring for this patient on the day of the visit/encounter including Risks and benefits of tx options, Instructions for management, Patient and family education, Documenting in the medical record, Reviewing / ordering tests, medicine, procedures  , and Obtaining or reviewing history  .

## 2025-02-27 DIAGNOSIS — F32.9 REACTIVE DEPRESSION: ICD-10-CM

## 2025-05-07 ENCOUNTER — ANNUAL EXAM (OUTPATIENT)
Dept: OBGYN CLINIC | Facility: CLINIC | Age: 35
End: 2025-05-07
Payer: COMMERCIAL

## 2025-05-07 VITALS
SYSTOLIC BLOOD PRESSURE: 110 MMHG | BODY MASS INDEX: 26.84 KG/M2 | DIASTOLIC BLOOD PRESSURE: 74 MMHG | HEIGHT: 66 IN | WEIGHT: 167 LBS

## 2025-05-07 DIAGNOSIS — Z01.419 ROUTINE GYNECOLOGICAL EXAMINATION: Primary | ICD-10-CM

## 2025-05-07 DIAGNOSIS — Z12.4 SCREENING FOR MALIGNANT NEOPLASM OF THE CERVIX: ICD-10-CM

## 2025-05-07 DIAGNOSIS — Z11.51 SCREENING FOR HPV (HUMAN PAPILLOMAVIRUS): ICD-10-CM

## 2025-05-07 PROCEDURE — G0124 SCREEN C/V THIN LAYER BY MD: HCPCS | Performed by: PATHOLOGY

## 2025-05-07 PROCEDURE — 99395 PREV VISIT EST AGE 18-39: CPT

## 2025-05-07 PROCEDURE — G0145 SCR C/V CYTO,THINLAYER,RESCR: HCPCS | Performed by: PATHOLOGY

## 2025-05-07 PROCEDURE — G0476 HPV COMBO ASSAY CA SCREEN: HCPCS

## 2025-05-07 NOTE — PROGRESS NOTES
ASSESSMENT & PLAN:   Diagnoses and all orders for this visit:    Routine gynecological examination  -     Liquid-based pap, screening    Screening for malignant neoplasm of the cervix  -     Liquid-based pap, screening    Screening for HPV (human papillomavirus)  -     Liquid-based pap, screening      The following were reviewed in today's visit: ASCCP guidelines, Gardisil vaccination, STD testing breast self exam, STD testing, exercise, and healthy diet.    Patient to return to office in yearly for annual exam.     All questions have been answered to her satisfaction.    CC:  Annual Gynecologic Examination  Chief Complaint   Patient presents with   • Gynecologic Exam     The patient is here for a yearly exam. Neg pap/HPV 22  Colonoscopy 23 - repeat 10 years.   Liletta inserted 2024    The patient has occasional spotting. She does not get pelvic pain.   No vaginal, bowel, bladder or breast problems.      HPI: Frederick Rubio is a 34 y.o.  who presents for annual gynecologic examination.  She has the following concerns:  none.   Liletta IUD inserted 2024    Health Maintenance:    Exercise: daily  Breast exams/breast awareness: yes    Past Medical History:   Diagnosis Date   • Anxiety    • Depression    • GERD (gastroesophageal reflux disease)      Past Surgical History:   Procedure Laterality Date   • MYRINGOTOMY       Past OB/Gyn History:   No LMP recorded. Patient has had an implant.    Last Pap: 2022 : no abnormalities  History of abnormal Pap smear: no  HPV vaccine completed: yes    Patient is currently sexually active.   STD testing: no  Current contraception:IUD - Liletta    Family History  Family History   Problem Relation Age of Onset   • Depression Mother    • Miscarriages / Stillbirths Mother    • Osteoporosis Mother    • Hypertension Mother    • Osteoarthritis Mother    • Hypertension Father    • Colon cancer Father         Appendiceal. Dx in . Reoccurrence in .  Terminal   • Cancer Father         Appendiceal cancer, terminal   • Glaucoma Father         Controlled with eyedrops   • Fibroids Sister    • Heart attack Maternal Grandfather         Late 50's early 60's. Terminal   • Cancer Paternal Grandmother         Pancreatic Cancer, terminal     Family history of uterine or ovarian cancer: no  Family history of breast cancer: no  Family history of colon cancer: yes, father    Social History:  Social History     Socioeconomic History   • Marital status: Single     Spouse name: Not on file   • Number of children: Not on file   • Years of education: Not on file   • Highest education level: Not on file   Occupational History   • Not on file   Tobacco Use   • Smoking status: Never   • Smokeless tobacco: Never   Vaping Use   • Vaping status: Never Used   Substance and Sexual Activity   • Alcohol use: Yes     Alcohol/week: 4.0 standard drinks of alcohol     Types: 4 Standard drinks or equivalent per week     Comment: socially   • Drug use: No   • Sexual activity: Yes     Partners: Male     Birth control/protection: I.U.D.   Other Topics Concern   • Not on file   Social History Narrative   • Not on file     Social Drivers of Health     Financial Resource Strain: Not on file   Food Insecurity: Not on file   Transportation Needs: Not on file   Physical Activity: Not on file   Stress: Not on file   Social Connections: Not on file   Intimate Partner Violence: Not At Risk (8/13/2019)    Humiliation, Afraid, Rape, and Kick questionnaire    • Fear of Current or Ex-Partner: No    • Emotionally Abused: No    • Physically Abused: No    • Sexually Abused: No   Housing Stability: Not on file     Domestic violence screen: negative    Allergies:  Allergies   Allergen Reactions   • Latex Rash       Medications:    Current Outpatient Medications:   •  EPINEPHrine (EPIPEN) 0.3 mg/0.3 mL SOAJ, Inject 0.3 mL (0.3 mg total) into a muscle once for 1 dose, Disp: 0.6 mL, Rfl: 2  •  hydrocortisone  "(ANUSOL-HC) 25 mg suppository, Insert 1 suppository (25 mg total) into the rectum 2 (two) times a day, Disp: 12 suppository, Rfl: 3  •  levonorgestrel (LILETTA) 18.6 MCG/DAY intrauterine device, by Intrauterine route, Disp: , Rfl:   •  meclizine (ANTIVERT) 12.5 MG tablet, Take 12.5 mg by mouth 3 (three) times a day as needed for dizziness, Disp: , Rfl:   •  pantoprazole (PROTONIX) 40 mg tablet, Take 1 tablet (40 mg total) by mouth daily, Disp: 90 tablet, Rfl: 0  •  sertraline (ZOLOFT) 50 mg tablet, TAKE 1 TABLET BY MOUTH EVERY DAY, Disp: 90 tablet, Rfl: 1    Review of Systems:  Review of Systems   Constitutional:  Negative for chills and fever.   Respiratory:  Negative for shortness of breath.    Cardiovascular:  Negative for chest pain.   Gastrointestinal:  Negative for abdominal pain, constipation and diarrhea.   Genitourinary:  Negative for dysuria, frequency, pelvic pain, vaginal discharge and vaginal pain.   Psychiatric/Behavioral:  Negative for self-injury and suicidal ideas.          Physical Exam:  /74   Ht 5' 6\" (1.676 m)   Wt 75.8 kg (167 lb)   BMI 26.95 kg/m²    Physical Exam  Constitutional:       Appearance: Normal appearance.   Genitourinary:      Vulva and urethral meatus normal.      No labial fusion noted.      No vaginal erythema or tenderness.        Right Adnexa: not tender.     Left Adnexa: not tender.     No cervical discharge or friability.      IUD strings visualized.      Uterus is not tender.   Breasts:     Breasts are symmetrical.      Right: Normal.      Left: Normal.   HENT:      Head: Normocephalic and atraumatic.   Neck:      Thyroid: No thyroid mass or thyroid tenderness.   Cardiovascular:      Rate and Rhythm: Normal rate and regular rhythm.      Heart sounds: Normal heart sounds. No murmur heard.  Pulmonary:      Effort: Pulmonary effort is normal.      Breath sounds: Normal breath sounds. No wheezing.   Abdominal:      Palpations: Abdomen is soft. There is no mass.      " Tenderness: There is no abdominal tenderness.   Lymphadenopathy:      Cervical: No cervical adenopathy.   Neurological:      General: No focal deficit present.      Mental Status: She is alert and oriented to person, place, and time.   Skin:     General: Skin is warm and dry.   Psychiatric:         Mood and Affect: Mood normal.         Behavior: Behavior normal.   Vitals and nursing note reviewed.

## 2025-05-08 ENCOUNTER — TELEPHONE (OUTPATIENT)
Age: 35
End: 2025-05-08

## 2025-05-08 NOTE — TELEPHONE ENCOUNTER
Received call from Patient for New Patient - Skin Check. Scheduled 10/2/25 1:00 pm [facility]. Verified insurance, provided [facility] addr.     Patient verbalized understanding.

## 2025-05-14 ENCOUNTER — RESULTS FOLLOW-UP (OUTPATIENT)
Dept: OBGYN CLINIC | Facility: CLINIC | Age: 35
End: 2025-05-14

## 2025-05-14 LAB
LAB AP GYN PRIMARY INTERPRETATION: ABNORMAL
Lab: ABNORMAL
PATH INTERP SPEC-IMP: ABNORMAL

## 2025-07-30 ENCOUNTER — OFFICE VISIT (OUTPATIENT)
Dept: GASTROENTEROLOGY | Facility: AMBULARY SURGERY CENTER | Age: 35
End: 2025-07-30
Payer: COMMERCIAL

## 2025-07-30 VITALS
WEIGHT: 174.2 LBS | DIASTOLIC BLOOD PRESSURE: 74 MMHG | SYSTOLIC BLOOD PRESSURE: 114 MMHG | HEART RATE: 63 BPM | HEIGHT: 66 IN | BODY MASS INDEX: 28 KG/M2 | OXYGEN SATURATION: 99 %

## 2025-07-30 DIAGNOSIS — K62.5 RECTAL BLEEDING: Primary | ICD-10-CM

## 2025-07-30 DIAGNOSIS — K21.00 GASTROESOPHAGEAL REFLUX DISEASE WITH ESOPHAGITIS, UNSPECIFIED WHETHER HEMORRHAGE: ICD-10-CM

## 2025-07-30 DIAGNOSIS — K27.9 PEPTIC ULCER: ICD-10-CM

## 2025-07-30 DIAGNOSIS — K64.8 INTERNAL HEMORRHOID: ICD-10-CM

## 2025-07-30 PROCEDURE — 99214 OFFICE O/P EST MOD 30 MIN: CPT

## 2025-07-30 RX ORDER — SODIUM CHLORIDE, SODIUM LACTATE, POTASSIUM CHLORIDE, CALCIUM CHLORIDE 600; 310; 30; 20 MG/100ML; MG/100ML; MG/100ML; MG/100ML
125 INJECTION, SOLUTION INTRAVENOUS CONTINUOUS
OUTPATIENT
Start: 2025-07-30

## 2025-07-30 RX ORDER — FAMOTIDINE 40 MG/1
40 TABLET, FILM COATED ORAL DAILY
Qty: 90 TABLET | Refills: 3 | Status: SHIPPED | OUTPATIENT
Start: 2025-07-30

## 2025-07-30 RX ORDER — PANTOPRAZOLE SODIUM 40 MG/1
40 TABLET, DELAYED RELEASE ORAL DAILY
Qty: 90 TABLET | Refills: 3 | Status: SHIPPED | OUTPATIENT
Start: 2025-07-30

## 2025-08-09 DIAGNOSIS — Z00.6 ENCOUNTER FOR EXAMINATION FOR NORMAL COMPARISON OR CONTROL IN CLINICAL RESEARCH PROGRAM: ICD-10-CM

## 2025-08-15 ENCOUNTER — CONSULT (OUTPATIENT)
Age: 35
End: 2025-08-15
Payer: COMMERCIAL